# Patient Record
Sex: MALE | Race: WHITE | Employment: FULL TIME | URBAN - NONMETROPOLITAN AREA
[De-identification: names, ages, dates, MRNs, and addresses within clinical notes are randomized per-mention and may not be internally consistent; named-entity substitution may affect disease eponyms.]

---

## 2017-01-31 ENCOUNTER — HISTORY (OUTPATIENT)
Dept: INTERNAL MEDICINE | Facility: OTHER | Age: 27
End: 2017-01-31

## 2017-01-31 ENCOUNTER — OFFICE VISIT - GICH (OUTPATIENT)
Dept: INTERNAL MEDICINE | Facility: OTHER | Age: 27
End: 2017-01-31

## 2017-01-31 DIAGNOSIS — R00.0 TACHYCARDIA: ICD-10-CM

## 2017-01-31 DIAGNOSIS — R30.0 DYSURIA: ICD-10-CM

## 2017-01-31 DIAGNOSIS — M54.50 LOW BACK PAIN: ICD-10-CM

## 2017-01-31 LAB
A/G RATIO - HISTORICAL: 1.7 (ref 1–2)
ABSOLUTE BASOPHILS - HISTORICAL: 0.1 THOU/CU MM
ABSOLUTE EOSINOPHILS - HISTORICAL: 0.1 THOU/CU MM
ABSOLUTE LYMPHOCYTES - HISTORICAL: 1.4 THOU/CU MM (ref 0.9–2.9)
ABSOLUTE MONOCYTES - HISTORICAL: 0.4 THOU/CU MM
ABSOLUTE NEUTROPHILS - HISTORICAL: 2 THOU/CU MM (ref 1.7–7)
ALBUMIN SERPL-MCNC: 4.5 G/DL (ref 3.5–5.7)
ALP SERPL-CCNC: 73 IU/L (ref 34–104)
ALT (SGPT) - HISTORICAL: 18 IU/L (ref 7–52)
ANION GAP - HISTORICAL: 11 (ref 5–18)
AST SERPL-CCNC: 20 IU/L (ref 13–39)
BASOPHILS # BLD AUTO: 1.9 %
BILIRUB SERPL-MCNC: 0.9 MG/DL (ref 0.3–1)
BILIRUB UR QL: NEGATIVE
BUN SERPL-MCNC: 23 MG/DL (ref 7–25)
BUN/CREAT RATIO - HISTORICAL: 23
CALCIUM SERPL-MCNC: 9.5 MG/DL (ref 8.6–10.3)
CHLORIDE SERPLBLD-SCNC: 103 MMOL/L (ref 98–107)
CHOL/HDL RATIO - HISTORICAL: 2.65
CHOLESTEROL TOTAL: 122 MG/DL
CLARITY, URINE: CLEAR CLARITY
CO2 SERPL-SCNC: 28 MMOL/L (ref 21–31)
COLOR UR: YELLOW COLOR
CREAT SERPL-MCNC: 1.01 MG/DL (ref 0.7–1.3)
EOSINOPHIL NFR BLD AUTO: 2.3 %
ERYTHROCYTE [DISTWIDTH] IN BLOOD BY AUTOMATED COUNT: 12 % (ref 11.5–15.5)
GFR IF NOT AFRICAN AMERICAN - HISTORICAL: >60 ML/MIN/1.73M2
GLOBULIN - HISTORICAL: 2.7 G/DL (ref 2–3.7)
GLUCOSE SERPL-MCNC: 93 MG/DL (ref 70–105)
GLUCOSE URINE: NEGATIVE MG/DL
HCT VFR BLD AUTO: 45.6 % (ref 37–53)
HDLC SERPL-MCNC: 46 MG/DL (ref 23–92)
HEMOGLOBIN: 15.5 G/DL (ref 13.5–17.5)
KETONES UR QL: NEGATIVE MG/DL
LDLC SERPL CALC-MCNC: 57 MG/DL
LEUKOCYTE ESTERASE URINE: NEGATIVE
LYMPHOCYTES NFR BLD AUTO: 35.9 % (ref 20–44)
MCH RBC QN AUTO: 30.8 PG (ref 26–34)
MCHC RBC AUTO-ENTMCNC: 34 G/DL (ref 32–36)
MCV RBC AUTO: 91 FL (ref 80–100)
MONOCYTES NFR BLD AUTO: 9.3 %
NEUTROPHILS NFR BLD AUTO: 50.6 % (ref 42–72)
NITRITE UR QL STRIP: NEGATIVE
NON-HDL CHOLESTEROL - HISTORICAL: 76 MG/DL
OCCULT BLOOD,URINE - HISTORICAL: NEGATIVE
PATIENT STATUS - HISTORICAL: NORMAL
PH UR: 6.5 [PH]
PLATELET # BLD AUTO: 188 THOU/CU MM (ref 140–440)
PMV BLD: 7.8 FL (ref 6.5–11)
POTASSIUM SERPL-SCNC: 4.2 MMOL/L (ref 3.5–5.1)
PROT SERPL-MCNC: 7.2 G/DL (ref 6.4–8.9)
PROTEIN QUALITATIVE,URINE - HISTORICAL: NEGATIVE MG/DL
RED BLOOD COUNT - HISTORICAL: 5.03 MIL/CU MM (ref 4.3–5.9)
SODIUM SERPL-SCNC: 142 MMOL/L (ref 133–143)
SP GR UR STRIP: 1.01
TRIGL SERPL-MCNC: 93 MG/DL
TSH - HISTORICAL: 1.31 UIU/ML (ref 0.34–5.6)
UROBILINOGEN,QUALITATIVE - HISTORICAL: NORMAL EU/DL
WHITE BLOOD COUNT - HISTORICAL: 3.9 THOU/CU MM (ref 4.5–11)

## 2017-04-06 ENCOUNTER — COMMUNICATION - GICH (OUTPATIENT)
Dept: INTERNAL MEDICINE | Facility: OTHER | Age: 27
End: 2017-04-06

## 2018-01-03 NOTE — NURSING NOTE
Patient Information     Patient Name MRN Sex Damon Egan [id number] Male 1990      Nursing Note by Osito Echavarria LPN at 2017  8:30 AM     Author:  Osito Echavarria LPN Service:  (none) Author Type:  NURS- Licensed Practical Nurse     Filed:  2017  8:30 AM Encounter Date:  2017 Status:  Signed     :  Osito Echavarria LPN (NURS- Licensed Practical Nurse)            Patient presents to the clinic for testicle pain in the last month and now it's been the lower back as well.  Osito Echavarria LPN ..............2017 8:23 AM

## 2018-01-03 NOTE — PROGRESS NOTES
"Patient Information     Patient Name MRN Sex Damon Egan [id number] Male 1990      Progress Notes by Jerson Jenkins MD at 2017  8:30 AM     Author:  Jerson Jenkins MD Service:  (none) Author Type:  Physician     Filed:  2017  8:54 AM Encounter Date:  2017 Status:  Signed     :  Jerson Jenkins MD (Physician)            SUBJECTIVE:    Damon Santiago is a 27 y.o. male who presents for back and groin pain.    HPI Comments: He comes in today for a check on some problems that he is having. He's had a sensation of testicular discomfort. He says it almost feels as though his testicles are being squeezed or grabbed. He has a little bit of urinary difficulty with this but nothing significant. He has now developed some bilateral flank area discomfort. He has not had a fever with this. He doesn't know whether the back pain could be related to working out and lifting weights or whether it somehow related to his urinary symptoms. He's here to have that evaluated and figured out.    On exam he was noted to have somewhat of a tachycardia. In addition he has somewhat of a tremor. There are thyroid problems in the family.    I spent time today updating his past medical history, past surgical history, family history and social history.      Allergies     Allergen  Reactions     Sulfa (Sulfonamide Antibiotics) *Unknown   ,   Current Outpatient Prescriptions     Medication  Sig     multivitamin (MVI) tablet Take 1 tablet by mouth once daily.     No current facility-administered medications for this visit.      Medications have been reviewed by me and are current to the best of my knowledge and ability. ,   Past Medical History     Diagnosis  Date     History of anxiety      History of ITP    ,   Patient Active Problem List       Diagnosis  Date Noted     Dysuria       Murmur  2014     Mole of skin  10/21/2013     FOLLICULITIS  2012     Likely Pseudomonas \"hot tub folliculitis \"      " "    ABDOMINAL PAIN, RECURRENT  11/30/2010     IMMUNE THROMBOCYTOPENIC PURPURA       platelets 12,000, petechiae          ANXIETY DISORDER     ,   Past Surgical History       Procedure   Laterality Date     Cystoscopy   2016     WNL      and   Social History       Substance Use Topics         Smoking status:   Never Smoker     Smokeless tobacco:   Never Used     Alcohol use   Yes      Comment: social      Family Status     Relation  Status     Father Alive     Mother Alive     Brother Alive     Social History     Social History        Marital status:  Single     Spouse name: N/A     Number of children:  N/A     Years of education:  N/A     Social History Main Topics          Smoking status:   Never Smoker      Smokeless tobacco:   Never Used      Alcohol use   Yes      Comment: social       Drug use:   No      Sexual activity:   Yes      Partners:  Female      Birth control/ protection:  None       Comment: Been with same partner for 1.5 years       Other Topics  Concern     None      Social History Narrative      at [employer].  Engaged to be .                       REVIEW OF SYSTEMS:  Review of Systems   All other systems reviewed and are negative.      OBJECTIVE:  /70  Pulse 100  Temp 97.7  F (36.5  C) (Tympanic)  Ht 1.854 m (6' 1\")  Wt 85.4 kg (188 lb 3.2 oz)  BMI 24.83 kg/m2    EXAM:   Physical Exam   Constitutional: He is well-developed, well-nourished, and in no distress. No distress.   Slightly anxious   Neck: Normal range of motion. Neck supple. No JVD present. No tracheal deviation present. No thyromegaly present.   Cardiovascular: Regular rhythm.  Tachycardia present.    Pulmonary/Chest: Effort normal and breath sounds normal. No respiratory distress. He has no wheezes. He has no rales.   Abdominal: Soft. Bowel sounds are normal. He exhibits no distension and no mass. There is no tenderness. There is no rebound and no guarding.   Genitourinary: Penis normal. He exhibits no " abnormal testicular mass, no testicular tenderness, no abnormal scrotal mass and no scrotal tenderness.   Musculoskeletal: He exhibits no edema.   Lymphadenopathy:     He has no cervical adenopathy.   Neurological: He is alert.   Slight tremor   Skin: Skin is warm and dry. He is not diaphoretic.   Psychiatric: Affect normal.   Nursing note and vitals reviewed.      ASSESSMENT/PLAN:    ICD-10-CM    1. Dysuria R30.0 URINALYSIS W REFLEX MICROSCOPIC IF POSITIVE      URINALYSIS W REFLEX MICROSCOPIC IF POSITIVE   2. Tachycardia R00.0    3. Bilateral low back pain without sciatica, unspecified chronicity M54.5         Plan:  His urine is normal. I'm not thinking there is going to be too much pathology from a genitourinary standpoint. His low back pain might just be from working out. I suggested watchful waiting. Because of these issues and also because of his slight tachycardia and tremor, I elected to do some other blood work including thyroid. Complete panel is pending and I will send him a letter with the results. If everything is normal and his lab but he has persistent problems she will return for re-evaluation. May have to consider CT scanning although I think that would be somewhat excessive at this time.

## 2018-01-27 VITALS
TEMPERATURE: 97.7 F | SYSTOLIC BLOOD PRESSURE: 130 MMHG | HEIGHT: 73 IN | DIASTOLIC BLOOD PRESSURE: 70 MMHG | WEIGHT: 188.2 LBS | HEART RATE: 100 BPM | BODY MASS INDEX: 24.94 KG/M2

## 2018-02-19 ENCOUNTER — DOCUMENTATION ONLY (OUTPATIENT)
Dept: FAMILY MEDICINE | Facility: OTHER | Age: 28
End: 2018-02-19

## 2018-02-19 PROBLEM — F41.1 ANXIETY STATE: Status: ACTIVE | Noted: 2018-02-19

## 2018-02-19 PROBLEM — D69.3 IMMUNE THROMBOCYTOPENIC PURPURA (H): Status: ACTIVE | Noted: 2018-02-19

## 2018-02-19 PROBLEM — R30.0 DYSURIA: Status: ACTIVE | Noted: 2018-02-19

## 2018-02-19 RX ORDER — DIPHENOXYLATE HYDROCHLORIDE AND ATROPINE SULFATE 2.5; .025 MG/1; MG/1
1 TABLET ORAL DAILY
COMMUNITY
Start: 2017-01-31

## 2018-07-23 NOTE — PROGRESS NOTES
Patient Information     Patient Name  Damon Santiago MRN  [id number] Sex  Male   1990      Letter by Jerson Jenkins MD at      Author:  Jerson Jenkins MD Service:  (none) Author Type:  (none)    Filed:   Encounter Date:  2017 Status:  (Other)           Damon Santiago  [address]  [place] [address]          2017    Dear Damon,    Following are the tests completed during your last clinic visit:    Results for orders placed or performed in visit on 17      URINALYSIS W REFLEX MICROSCOPIC IF POSITIVE      Result  Value Ref Range    COLOR                     Yellow Yellow Color    CLARITY                   Clear Clear Clarity    SPECIFIC GRAVITY,URINE    1.015 1.010, 1.015, 1.020, 1.025                    PH,URINE                  6.5 6.0, 7.0, 8.0, 5.5, 6.5, 7.5, 8.5                    UROBILINOGEN,QUALITATIVE  Normal Normal EU/dl    PROTEIN, URINE Negative Negative mg/dL    GLUCOSE, URINE Negative Negative mg/dL    KETONES,URINE             Negative Negative mg/dL    BILIRUBIN,URINE           Negative Negative                    OCCULT BLOOD,URINE        Negative Negative                    NITRITE                   Negative Negative                    LEUKOCYTE ESTERASE        Negative Negative                   COMP METABOLIC PANEL      Result  Value Ref Range    SODIUM 142 133 - 143 mmol/L    POTASSIUM 4.2 3.5 - 5.1 mmol/L    CHLORIDE 103 98 - 107 mmol/L    CO2,TOTAL 28 21 - 31 mmol/L    ANION GAP 11 5 - 18                    GLUCOSE 93 70 - 105 mg/dL    CALCIUM 9.5 8.6 - 10.3 mg/dL    BUN 23 7 - 25 mg/dL    CREATININE 1.01 0.70 - 1.30 mg/dL    BUN/CREAT RATIO           23                    GFR if African American >60 >60 ml/min/1.73m2    GFR if not African American >60 >60 ml/min/1.73m2    ALBUMIN 4.5 3.5 - 5.7 g/dL    PROTEIN,TOTAL 7.2 6.4 - 8.9 g/dL    GLOBULIN                  2.7 2.0 - 3.7 g/dL    A/G RATIO 1.7 1.0 - 2.0                    BILIRUBIN,TOTAL 0.9 0.3 -  1.0 mg/dL    ALK PHOSPHATASE 73 34 - 104 IU/L    ALT (SGPT) 18 7 - 52 IU/L    AST (SGOT) 20 13 - 39 IU/L   LIPID PANEL      Result  Value Ref Range    CHOLESTEROL,TOTAL 122 <200 mg/dL    TRIGLYCERIDES 93 <150 mg/dL    HDL CHOLESTEROL 46 23 - 92 mg/dL    NON-HDL CHOLESTEROL 76 <145 mg/dl    CHOL/HDL RATIO            2.65 <4.50                    LDL CHOLESTEROL 57 <100 mg/dL    PATIENT STATUS            FASTING                   TSH      Result  Value Ref Range    TSH 1.31 0.34 - 5.60 uIU/mL   CBC WITH AUTO DIFFERENTIAL      Result  Value Ref Range    WHITE BLOOD COUNT         3.9 (L) 4.5 - 11.0 thou/cu mm    RED BLOOD COUNT           5.03 4.30 - 5.90 mil/cu mm    HEMOGLOBIN                15.5 13.5 - 17.5 g/dL    HEMATOCRIT                45.6 37.0 - 53.0 %    MCV                       91 80 - 100 fL    MCH                       30.8 26.0 - 34.0 pg    MCHC                      34.0 32.0 - 36.0 g/dL    RDW                       12.0 11.5 - 15.5 %    PLATELET COUNT            188 140 - 440 thou/cu mm    MPV                       7.8 6.5 - 11.0 fL    NEUTROPHILS               50.6 42.0 - 72.0 %    LYMPHOCYTES               35.9 20.0 - 44.0 %    MONOCYTES                 9.3 <12.0 %    EOSINOPHILS               2.3 <8.0 %    BASOPHILS                 1.9 <3.0 %    ABSOLUTE NEUTROPHILS      2.0 1.7 - 7.0 thou/cu mm    ABSOLUTE LYMPHOCYTES      1.4 0.9 - 2.9 thou/cu mm    ABSOLUTE MONOCYTES        0.4 <0.9 thou/cu mm    ABSOLUTE EOSINOPHILS      0.1 <0.5 thou/cu mm    ABSOLUTE BASOPHILS        0.1 <0.3 thou/cu mm             All of your blood tests are normal. Of course your urine test was normal as well. Congratulations on this excellent report. If you have any questions about your results or are still not feeling well, be sure to let me know.    Sincerely,      Jerson Jenkins MD  Internal Medicine  [facility]

## 2018-07-24 NOTE — PROGRESS NOTES
Patient Information     Patient Name  Damon Santiago MRN  [id number] Sex  Male   1990      Letter by Jerson Jenkins MD at      Author:  Jerson Jenkins MD Service:  (none) Author Type:  (none)    Filed:   Encounter Date:  2017 Status:  (Other)         [employer] [facility]  [address] [address] [address] [place] [address]    2017    Damon Santiago  [address]  [place] [facility] [place] [address]    Dear Mr. Santiago    We have received your [employer] application. However, additional information is required before we can complete the process. The following item(s) are missing or incomplete:    Email does not match email listed in your medical record.    Demographic information in your health record, must be updated by your primary care clinic or by calling [employer] [facility] at [phone].    You will need to complete and submit a new application with all required information. Please go to [email or web address] to:    print new application form(s) by clicking on Sign up now    sign up online for an account for yourself.    For questions or technical assistance, call [phone].    Thank you for choosing [employer]!

## 2019-10-10 ENCOUNTER — HOSPITAL ENCOUNTER (OUTPATIENT)
Dept: MRI IMAGING | Facility: OTHER | Age: 29
Discharge: HOME | End: 2019-10-10
Attending: NURSE PRACTITIONER | Admitting: FAMILY MEDICINE
Payer: COMMERCIAL

## 2019-10-10 DIAGNOSIS — R20.0 NUMBNESS: ICD-10-CM

## 2019-10-10 DIAGNOSIS — M79.89 MASS OF SOFT TISSUE: ICD-10-CM

## 2019-10-10 PROCEDURE — A9575 INJ GADOTERATE MEGLUMI 0.1ML: HCPCS | Performed by: RADIOLOGY

## 2019-10-10 PROCEDURE — 72157 MRI CHEST SPINE W/O & W/DYE: CPT

## 2019-10-10 PROCEDURE — 25500064 ZZH RX 255 OP 636: Performed by: RADIOLOGY

## 2019-10-10 RX ADMIN — GADOTERATE MEGLUMINE 17 ML: 376.9 INJECTION INTRAVENOUS at 09:18

## 2020-03-05 DIAGNOSIS — N52.8 OTHER MALE ERECTILE DYSFUNCTION: Primary | ICD-10-CM

## 2020-03-05 RX ORDER — SILDENAFIL CITRATE 20 MG/1
20-40 TABLET ORAL DAILY PRN
Qty: 40 TABLET | Refills: 2 | Status: SHIPPED | OUTPATIENT
Start: 2020-03-05 | End: 2020-03-10

## 2020-03-05 NOTE — PROGRESS NOTES
Along with wife at her OB check appointment today; and discusses difficulty in obtaining an erection.  Likely psychological with onset at same time as finding out they were pregnant.  He does not feel stressed about the upcoming baby, does admit to stress at work.  Ruminates daily about not being able to obtain an erection.  Had tried a friend's viagra and that was helpful.  Would like his own Rx and will follow up in one week to review possible anxiety and other psychological issues contributing to current symptoms.  Rx sent to pharmacy.  Nilsa Rowan DO on 3/5/2020 at 12:39 PM

## 2020-03-10 ENCOUNTER — OFFICE VISIT (OUTPATIENT)
Dept: FAMILY MEDICINE | Facility: OTHER | Age: 30
End: 2020-03-10
Attending: FAMILY MEDICINE
Payer: COMMERCIAL

## 2020-03-10 VITALS
HEART RATE: 76 BPM | WEIGHT: 194 LBS | TEMPERATURE: 96.1 F | RESPIRATION RATE: 20 BRPM | HEIGHT: 74 IN | DIASTOLIC BLOOD PRESSURE: 86 MMHG | BODY MASS INDEX: 24.9 KG/M2 | SYSTOLIC BLOOD PRESSURE: 130 MMHG

## 2020-03-10 DIAGNOSIS — N52.8 OTHER MALE ERECTILE DYSFUNCTION: ICD-10-CM

## 2020-03-10 DIAGNOSIS — F41.1 ANXIETY STATE: Primary | ICD-10-CM

## 2020-03-10 PROBLEM — D69.3 IMMUNE THROMBOCYTOPENIC PURPURA (H): Status: RESOLVED | Noted: 2018-02-19 | Resolved: 2020-03-10

## 2020-03-10 LAB
ALBUMIN SERPL-MCNC: 4.7 G/DL (ref 3.5–5.7)
ALP SERPL-CCNC: 66 U/L (ref 34–104)
ALT SERPL W P-5'-P-CCNC: 23 U/L (ref 7–52)
ANION GAP SERPL CALCULATED.3IONS-SCNC: 9 MMOL/L (ref 3–14)
AST SERPL W P-5'-P-CCNC: 18 U/L (ref 13–39)
BASOPHILS # BLD AUTO: 0.1 10E9/L (ref 0–0.2)
BASOPHILS NFR BLD AUTO: 1.2 %
BILIRUB SERPL-MCNC: 0.9 MG/DL (ref 0.3–1)
BUN SERPL-MCNC: 20 MG/DL (ref 7–25)
CALCIUM SERPL-MCNC: 9.5 MG/DL (ref 8.6–10.3)
CHLORIDE SERPL-SCNC: 103 MMOL/L (ref 98–107)
CHOLEST SERPL-MCNC: 132 MG/DL
CO2 SERPL-SCNC: 28 MMOL/L (ref 21–31)
CREAT SERPL-MCNC: 1.06 MG/DL (ref 0.7–1.3)
CRP SERPL-MCNC: 0.2 MG/L
DIFFERENTIAL METHOD BLD: NORMAL
EOSINOPHIL # BLD AUTO: 0.2 10E9/L (ref 0–0.7)
EOSINOPHIL NFR BLD AUTO: 3.7 %
ERYTHROCYTE [DISTWIDTH] IN BLOOD BY AUTOMATED COUNT: 11.9 % (ref 10–15)
ERYTHROCYTE [SEDIMENTATION RATE] IN BLOOD BY WESTERGREN METHOD: 2 MM/H (ref 1–10)
GFR SERPL CREATININE-BSD FRML MDRD: 81 ML/MIN/{1.73_M2}
GLUCOSE SERPL-MCNC: 106 MG/DL (ref 70–105)
HBA1C MFR BLD: 4.9 % (ref 4–6)
HCT VFR BLD AUTO: 45.8 % (ref 40–53)
HDLC SERPL-MCNC: 45 MG/DL (ref 23–92)
HGB BLD-MCNC: 15.2 G/DL (ref 13.3–17.7)
IMM GRANULOCYTES # BLD: 0 10E9/L (ref 0–0.4)
IMM GRANULOCYTES NFR BLD: 0.2 %
LDLC SERPL CALC-MCNC: 63 MG/DL
LYMPHOCYTES # BLD AUTO: 1.4 10E9/L (ref 0.8–5.3)
LYMPHOCYTES NFR BLD AUTO: 34.1 %
MCH RBC QN AUTO: 30.6 PG (ref 26.5–33)
MCHC RBC AUTO-ENTMCNC: 33.2 G/DL (ref 31.5–36.5)
MCV RBC AUTO: 92 FL (ref 78–100)
MONOCYTES # BLD AUTO: 0.4 10E9/L (ref 0–1.3)
MONOCYTES NFR BLD AUTO: 10 %
NEUTROPHILS # BLD AUTO: 2.1 10E9/L (ref 1.6–8.3)
NEUTROPHILS NFR BLD AUTO: 50.8 %
NONHDLC SERPL-MCNC: 87 MG/DL
PLATELET # BLD AUTO: 192 10E9/L (ref 150–450)
POTASSIUM SERPL-SCNC: 4.1 MMOL/L (ref 3.5–5.1)
PROT SERPL-MCNC: 7.5 G/DL (ref 6.4–8.9)
RBC # BLD AUTO: 4.96 10E12/L (ref 4.4–5.9)
SODIUM SERPL-SCNC: 140 MMOL/L (ref 134–144)
TESTOST SERPL-MCNC: 366 NG/DL (ref 175–781)
TRIGL SERPL-MCNC: 119 MG/DL
TSH SERPL DL<=0.05 MIU/L-ACNC: 1.09 IU/ML (ref 0.34–5.6)
WBC # BLD AUTO: 4.1 10E9/L (ref 4–11)

## 2020-03-10 PROCEDURE — 83036 HEMOGLOBIN GLYCOSYLATED A1C: CPT | Mod: ZL | Performed by: FAMILY MEDICINE

## 2020-03-10 PROCEDURE — 85025 COMPLETE CBC W/AUTO DIFF WBC: CPT | Mod: ZL | Performed by: FAMILY MEDICINE

## 2020-03-10 PROCEDURE — 84403 ASSAY OF TOTAL TESTOSTERONE: CPT | Mod: ZL | Performed by: FAMILY MEDICINE

## 2020-03-10 PROCEDURE — 85652 RBC SED RATE AUTOMATED: CPT | Mod: ZL | Performed by: FAMILY MEDICINE

## 2020-03-10 PROCEDURE — 99214 OFFICE O/P EST MOD 30 MIN: CPT | Performed by: FAMILY MEDICINE

## 2020-03-10 PROCEDURE — 80053 COMPREHEN METABOLIC PANEL: CPT | Mod: ZL | Performed by: FAMILY MEDICINE

## 2020-03-10 PROCEDURE — 84443 ASSAY THYROID STIM HORMONE: CPT | Mod: ZL | Performed by: FAMILY MEDICINE

## 2020-03-10 PROCEDURE — 86140 C-REACTIVE PROTEIN: CPT | Mod: ZL | Performed by: FAMILY MEDICINE

## 2020-03-10 PROCEDURE — 80061 LIPID PANEL: CPT | Mod: ZL | Performed by: FAMILY MEDICINE

## 2020-03-10 PROCEDURE — 36415 COLL VENOUS BLD VENIPUNCTURE: CPT | Mod: ZL | Performed by: FAMILY MEDICINE

## 2020-03-10 RX ORDER — SILDENAFIL CITRATE 20 MG/1
20-40 TABLET ORAL DAILY PRN
Qty: 40 TABLET | Refills: 5 | Status: SHIPPED | OUTPATIENT
Start: 2020-03-10 | End: 2024-09-07

## 2020-03-10 ASSESSMENT — ANXIETY QUESTIONNAIRES
GAD7 TOTAL SCORE: 14
6. BECOMING EASILY ANNOYED OR IRRITABLE: MORE THAN HALF THE DAYS
2. NOT BEING ABLE TO STOP OR CONTROL WORRYING: NEARLY EVERY DAY
3. WORRYING TOO MUCH ABOUT DIFFERENT THINGS: NEARLY EVERY DAY
7. FEELING AFRAID AS IF SOMETHING AWFUL MIGHT HAPPEN: SEVERAL DAYS
1. FEELING NERVOUS, ANXIOUS, OR ON EDGE: MORE THAN HALF THE DAYS
5. BEING SO RESTLESS THAT IT IS HARD TO SIT STILL: SEVERAL DAYS
IF YOU CHECKED OFF ANY PROBLEMS ON THIS QUESTIONNAIRE, HOW DIFFICULT HAVE THESE PROBLEMS MADE IT FOR YOU TO DO YOUR WORK, TAKE CARE OF THINGS AT HOME, OR GET ALONG WITH OTHER PEOPLE: VERY DIFFICULT

## 2020-03-10 ASSESSMENT — ENCOUNTER SYMPTOMS
COUGH: 0
DIARRHEA: 0
HEMATOCHEZIA: 0
CHILLS: 0
NERVOUS/ANXIOUS: 1
HEARTBURN: 0
FEVER: 0
APPETITE CHANGE: 0
FREQUENCY: 0
CONSTIPATION: 0
PALPITATIONS: 0
ACTIVITY CHANGE: 0
FATIGUE: 0
HEMATURIA: 0
CHEST TIGHTNESS: 1
DYSURIA: 0
SHORTNESS OF BREATH: 0
ABDOMINAL PAIN: 0

## 2020-03-10 ASSESSMENT — PATIENT HEALTH QUESTIONNAIRE - PHQ9
5. POOR APPETITE OR OVEREATING: MORE THAN HALF THE DAYS
SUM OF ALL RESPONSES TO PHQ QUESTIONS 1-9: 6

## 2020-03-10 ASSESSMENT — MIFFLIN-ST. JEOR: SCORE: 1904.73

## 2020-03-10 ASSESSMENT — PAIN SCALES - GENERAL: PAINLEVEL: NO PAIN (0)

## 2020-03-10 NOTE — NURSING NOTE
"Chief Complaint   Patient presents with     Anxiety       Initial /86   Pulse 76   Temp 96.1  F (35.6  C) (Tympanic)   Resp 20   Ht 1.88 m (6' 2\")   Wt 88 kg (194 lb)   BMI 24.91 kg/m   Estimated body mass index is 24.91 kg/m  as calculated from the following:    Height as of this encounter: 1.88 m (6' 2\").    Weight as of this encounter: 88 kg (194 lb).  Medication Reconciliation: complete    Nata Nieves LPN  "

## 2020-03-11 ASSESSMENT — ANXIETY QUESTIONNAIRES: GAD7 TOTAL SCORE: 14

## 2020-03-11 NOTE — PATIENT INSTRUCTIONS
Patient Education     Your Body s Response to Anxiety    Normal anxiety is part of the body s natural defense system. It's an alert to a threat that is unknown, vague, or comes from your own internal fears. While you re in this state, your feelings can range from a vague sense of worry to physical sensations such as a pounding heartbeat. These feelings make you want to react to the threat. An anxiety response is normal in many situations. But when you have an anxiety disorder, the same response can occur at the wrong times.  Anxiety can be helpful  Normal anxiety is a signal from your brain that warns you of a threat and is a normal response to help you prevent something or decrease the bad effects of something you can't control. For example, anxiety is a normal response to situations that might damage your body, separate you from a loved one, or lose your job. The symptoms of anxiety can be physical and mental.  How does it feel?  At certain times, people with anxiety may have:    Dizziness    Muscle tension or pain    Restlessness    Sleeplessness    Trouble concentrating    Racing heartbeat    Fast breathing    Shaking or trembling    Stomachache    Diarrhea    Loss of energy    Sweating    Cold, clammy hands    Chest pain    Dry mouth  Anxiety can also be a problem  Anxiety can become a problem when it is hard to control, occurs for months, and interferes with important parts of your life. With an anxiety disorder, your body has the response described above, but in inappropriate ways. The response a person has depends on the anxiety disorder he or she has. With some disorders, the anxiety is way out of proportion to the threat that triggers it. With others, anxiety may occur even when there isn t a clear threat or trigger.  Who does it affect?  Some people are more prone to persistent anxiety than others. It tends to run in families, and it affects more younger people than older people, and more women than men.  "But no age, race, or gender is immune to anxiety problems.  Anxiety can be treated  The good news is that the anxiety that s disrupting your life can be treated. Check with your healthcare provider and rule out any physical problems that may be causing the anxiety symptoms. If an anxiety disorder is diagnosed seek mental healthcare. This is an illness and it can respond to treatment. Most types of anxiety disorders will respond to \"talk therapy\" and medicines. Working with your doctor or other healthcare provider, you can develop skills to help you cope with anxiety. You can also gain the perspective you need to overcome your fears. Note: Good sources of support or guidance can be found at your local hospital, mental health clinic, or an employee assistance program.  How to cope with anxiety  If anxiety is wearing you down, here are some things you can do to cope:    Keep in mind that you can t control everything about a situation. Change what you can and let the rest take its course.    Exercise--it s a great way to relieve tension and help your body feel relaxed.    Avoid caffeine and nicotine, which can make anxiety symptoms worse.    Fight the temptation to turn to alcohol or unprescribed drugs for relief. They only make things worse in the long run.    Educate yourself about anxiety disorders. Keep track of helpful online resources and books you can use during stressful periods.    Try stress management techniques such as meditation.    Consider online or in-person support groups.   Date Last Reviewed: 1/1/2017 2000-2019 [employer]. [address] [place] [address], [place] [address]. All rights reserved. This information is not intended as a substitute for professional medical care. Always follow your healthcare professional's instructions.           "

## 2020-12-27 ENCOUNTER — HEALTH MAINTENANCE LETTER (OUTPATIENT)
Age: 30
End: 2020-12-27

## 2021-03-28 ENCOUNTER — ALLIED HEALTH/NURSE VISIT (OUTPATIENT)
Dept: FAMILY MEDICINE | Facility: OTHER | Age: 31
End: 2021-03-28
Attending: FAMILY MEDICINE
Payer: COMMERCIAL

## 2021-03-28 DIAGNOSIS — Z20.822 EXPOSURE TO COVID-19 VIRUS: Primary | ICD-10-CM

## 2021-03-28 LAB
SARS-COV-2 RNA RESP QL NAA+PROBE: NORMAL
SPECIMEN SOURCE: NORMAL

## 2021-03-28 PROCEDURE — U0005 INFEC AGEN DETEC AMPLI PROBE: HCPCS | Mod: ZL | Performed by: FAMILY MEDICINE

## 2021-03-28 PROCEDURE — U0003 INFECTIOUS AGENT DETECTION BY NUCLEIC ACID (DNA OR RNA); SEVERE ACUTE RESPIRATORY SYNDROME CORONAVIRUS 2 (SARS-COV-2) (CORONAVIRUS DISEASE [COVID-19]), AMPLIFIED PROBE TECHNIQUE, MAKING USE OF HIGH THROUGHPUT TECHNOLOGIES AS DESCRIBED BY CMS-2020-01-R: HCPCS | Mod: ZL | Performed by: FAMILY MEDICINE

## 2021-03-28 PROCEDURE — C9803 HOPD COVID-19 SPEC COLLECT: HCPCS

## 2021-03-29 LAB
LABORATORY COMMENT REPORT: NORMAL
SARS-COV-2 RNA RESP QL NAA+PROBE: NEGATIVE
SPECIMEN SOURCE: NORMAL

## 2021-08-24 ENCOUNTER — OFFICE VISIT (OUTPATIENT)
Dept: FAMILY MEDICINE | Facility: OTHER | Age: 31
End: 2021-08-24
Attending: PHYSICIAN ASSISTANT
Payer: COMMERCIAL

## 2021-08-24 VITALS
BODY MASS INDEX: 23.72 KG/M2 | DIASTOLIC BLOOD PRESSURE: 84 MMHG | OXYGEN SATURATION: 100 % | RESPIRATION RATE: 18 BRPM | SYSTOLIC BLOOD PRESSURE: 136 MMHG | HEART RATE: 91 BPM | TEMPERATURE: 98.1 F | HEIGHT: 74 IN | WEIGHT: 184.8 LBS

## 2021-08-24 DIAGNOSIS — N50.9 TESTICLE TROUBLE: ICD-10-CM

## 2021-08-24 DIAGNOSIS — N39.9 URINARY PROBLEM IN MALE: Primary | ICD-10-CM

## 2021-08-24 DIAGNOSIS — S39.012A BACK STRAIN, INITIAL ENCOUNTER: ICD-10-CM

## 2021-08-24 LAB
ALBUMIN UR-MCNC: NEGATIVE MG/DL
APPEARANCE UR: CLEAR
BILIRUB UR QL STRIP: NEGATIVE
C TRACH DNA SPEC QL PROBE+SIG AMP: NEGATIVE
COLOR UR AUTO: ABNORMAL
GLUCOSE UR STRIP-MCNC: NEGATIVE MG/DL
HGB UR QL STRIP: NEGATIVE
KETONES UR STRIP-MCNC: 10 MG/DL
LEUKOCYTE ESTERASE UR QL STRIP: NEGATIVE
N GONORRHOEA DNA SPEC QL NAA+PROBE: NEGATIVE
NITRATE UR QL: NEGATIVE
PH UR STRIP: 6.5 [PH] (ref 5–9)
SP GR UR STRIP: 1.02 (ref 1–1.03)
UROBILINOGEN UR STRIP-MCNC: NORMAL MG/DL

## 2021-08-24 PROCEDURE — 87491 CHLMYD TRACH DNA AMP PROBE: CPT | Mod: ZL | Performed by: PHYSICIAN ASSISTANT

## 2021-08-24 PROCEDURE — 81003 URINALYSIS AUTO W/O SCOPE: CPT | Mod: ZL | Performed by: PHYSICIAN ASSISTANT

## 2021-08-24 PROCEDURE — 99213 OFFICE O/P EST LOW 20 MIN: CPT | Performed by: PHYSICIAN ASSISTANT

## 2021-08-24 ASSESSMENT — MIFFLIN-ST. JEOR: SCORE: 1858

## 2021-08-24 ASSESSMENT — PAIN SCALES - GENERAL: PAINLEVEL: NO PAIN (0)

## 2021-08-24 NOTE — PROGRESS NOTES
ASSESSMENT/PLAN:    I have reviewed the nursing notes.  I have reviewed the findings, diagnosis, plan and need for follow up with the patient.    1. Back strain, initial encounter  If feel patient's back pain is more muscular in nature from working out and recent increase of activity (soft ball x2 weeks prior). I do not have concerns for bony or other soft tissue injury at this time as exam most consistent with strain. Discussed that strains are typically self-limiting and will heal in 4 to 8 weeks duration of time.  Recommend alternating Tylenol and ibuprofen every 4-6 hours if able, do not exceed daily limits as reviewed on AVS (4000 mg of Tylenol daily, 1200 mg of ibuprofen daily), alternate heat and ice, gentle range of motion as tolerated.  If an orthopedic referral was placed patient understands that they will contact the patient directly to schedule this visit.  Patient runs into any difficulties/setbacks during recovery they should follow-up with her PCP or orthopedics for reevaluation. Patient is in agreement and understanding of the above treatment plan. All questions and concerns were addressed and answered to patient's satisfaction. AVS reviewed with patient.     2. Testicle trouble  In regards to reported testicular irritation, patient's PE was overall normal symptoms are unable to be reproduced on exam, without any acute findings. No concerns of torsion at this time. UA ordered as well as GC to rule out infectious etiology to symptoms, results are as follows: Negative urine and GC chlamydia.  Due to normal physical exam and laboratory studies and ultrasound was not ordered at this time.  Believe that symptoms could be due to patient recently working on increasing workouts.  If symptoms persist recommend follow-up with PCP.  If any alarm signs such as fevers, chills, worsening testicular pain or other abnormalities occur such as a high riding testicle patient is agreeable to return for  reevaluation.    3. Urinary problem in male  - UA reflex to Microscopic and Culture  - GC/Chlamydia by PCR    If feel patient's back pain is more muscular in nature from working out and recent increase of activity (soft ball x2 weeks prior). I do not have concerns for bony or other soft tissue injury at this time as exam most consistent with strain. Discussed that strains are typically self-limiting and will heal in 4 to 8 weeks duration of time.  Recommend alternating Tylenol and ibuprofen every 4-6 hours if able, do not exceed daily limits as reviewed on AVS (4000 mg of Tylenol daily, 1200 mg of ibuprofen daily), alternate heat and ice, gentle range of motion as tolerated.  If an orthopedic referral was placed patient understands that they will contact the patient directly to schedule this visit.  Patient runs into any difficulties/setbacks during recovery they should follow-up with her PCP or orthopedics for reevaluation. Patient is in agreement and understanding of the above treatment plan. All questions and concerns were addressed and answered to patient's satisfaction. AVS reviewed with patient.     Discussed warning signs/symptoms indicative of need to f/u    Follow up if symptoms persist or worsen or concerns    I explained my diagnostic considerations and recommendations to the patient, who voiced understanding and agreement with the treatment plan. All questions were answered. We discussed potential side effects of any prescribed or recommended therapies, as well as expectations for response to treatments.    Nedra Crabtree PA-C  8/24/2021  11:05 AM    HPI:    Damon Santiago is a 31 year old male  who presents to [facility] today for concerns of back pain and left testicular pain x 7-10 days duration.     Back symptoms:  His back pain located at low back left  Duration of symptoms: 1.5 weeks  Pain: 0/10, dull ache  Pain progression: same  Injury: none  Mechanical symptoms (popping, locking, catching):  No  Palliative: unsure  Provocative: unsure  Presence of sciatic symptoms: none    Treatments Tried: ibuprofen and Tylenol    Testicular symptoms:   -Onset 7-10 days  -Pain only with palpation, otherwise no pain or tenderness   -Swelling: thought at first, but now feels it is normal size for him  -Urinary symptoms: denies urinary symptoms (blood, urgency, frequency, etc.) denies fevers, chills, signs of infection.   -No trauma to site - does work out, but low weight low repetition   -Concerns of STD/STI: none - no new partners  -History of similar symptoms: none    Treatments Tried: ibuprofen and Tylenol    Prior history of similar symptoms: none    No fevers, weight loss, bowel / bladder incontinence or localized weakness.    PCP: MD Gregory    Past Medical History:   Diagnosis Date     Immune thrombocytopenic purpura (H) 2/19/2018    Overview:  platelets 12,000, petechiae     Personal history of diseases of the blood and blood-forming organs and certain disorders involving the immune mechanism (CODE)     No Comments Provided     Personal history of other mental and behavioral disorders     No Comments Provided     Past Surgical History:   Procedure Laterality Date     CYSTOSCOPY      2016,WNL     Social History     Tobacco Use     Smoking status: Never Smoker     Smokeless tobacco: Never Used   Substance Use Topics     Alcohol use: Yes     Comment: Alcoholic Drinks/day: social     Current Outpatient Medications   Medication Sig Dispense Refill     Multiple Vitamin (MULTI-VITAMINS) TABS Take 1 tablet by mouth daily       sildenafil (REVATIO) 20 MG tablet Take 1-2 tablets (20-40 mg) by mouth daily as needed (ED) 40 tablet 5     Allergies   Allergen Reactions     Sulfa Drugs Unknown     Past medical history, past surgical history, current medications and allergies reviewed and accurate to the best of my knowledge.      ROS:  Refer to HPI    /84   Pulse 91   Temp 98.1  F (36.7  C) (Tympanic)   Resp 18   Ht  "1.88 m (6' 2\")   Wt 83.8 kg (184 lb 12.8 oz)   SpO2 100%   BMI 23.73 kg/m      EXAM:  General Appearance: Well appearing 31-year old male, appropriate appearance for age. No acute distress  Respiratory: normal chest wall and respirations.  Normal effort.  Clear to auscultation bilaterally, no wheezing, crackles or rhonchi.  No increased work of breathing.  No cough appreciated.  Cardiac: RRR with no murmurs  Abdomen: soft, nontender, no rigidity, no rebound tenderness or guarding, normal bowel sounds present  :  No suprapubic tenderness to palpation.  No CVA tenderness to palpation.  Scrotal/ exam: skin is of normal appearance without edema, erythema or other abnormalities noted. Testicles are even (no high riding testicle), there is no tenderness to palpation on examination of either testicle or penile region. No discharge noted. No other abnormalities noted on exam.   Musculoskeletal:    Thoracic/Lumbar Spine:  Inspection:    Lordosis: Normal   Kyphosis: Normal  Tenderness: left para lumbar muscles  ROM: normal lumbar flexion, extension, lateral rotation and bending  Strength: able to heel walk  Special Test: negative straight leg raises  Awake, alert, oriented to name, place and time.  Cranial nerves II-XII are grossly intact.  Motor is 5 out of 5 bilaterally.   Dermatological: no rashes noted of exposed skin  Psychological: normal affect, alert, oriented, and pleasant.     Labs:  UA; negative for signs of infection  GC: negative    Xray:  None     "

## 2021-08-24 NOTE — NURSING NOTE
"Chief Complaint   Patient presents with     Testicular/scrotal Pain     Back Pain     Left testicle has been tender for about 10 days. He states that the tenderness is on the bottom and back half of his testicle. Back pain has been going on for 7-10 days. He denies painful urination     Initial There were no vitals taken for this visit. Estimated body mass index is 24.91 kg/m  as calculated from the following:    Height as of 3/10/20: 1.88 m (6' 2\").    Weight as of 3/10/20: 88 kg (194 lb).     FOOD SECURITY SCREENING QUESTIONS  Hunger Vital Signs:  Within the past 12 months we worried whether our food would run out before we got money to buy more. Never  Within the past 12 months the food we bought just didn't last and we didn't have money to get more. Never      Medication Reconciliation: Complete      Pedro Love LPN   "

## 2021-10-09 ENCOUNTER — HEALTH MAINTENANCE LETTER (OUTPATIENT)
Age: 31
End: 2021-10-09

## 2022-01-29 ENCOUNTER — HEALTH MAINTENANCE LETTER (OUTPATIENT)
Age: 32
End: 2022-01-29

## 2022-09-17 ENCOUNTER — HEALTH MAINTENANCE LETTER (OUTPATIENT)
Age: 32
End: 2022-09-17

## 2022-12-14 ENCOUNTER — OFFICE VISIT (OUTPATIENT)
Dept: FAMILY MEDICINE | Facility: OTHER | Age: 32
End: 2022-12-14
Payer: COMMERCIAL

## 2022-12-14 ENCOUNTER — HOSPITAL ENCOUNTER (OUTPATIENT)
Dept: GENERAL RADIOLOGY | Facility: OTHER | Age: 32
Discharge: HOME | End: 2022-12-14
Payer: COMMERCIAL

## 2022-12-14 VITALS
DIASTOLIC BLOOD PRESSURE: 80 MMHG | OXYGEN SATURATION: 98 % | RESPIRATION RATE: 18 BRPM | BODY MASS INDEX: 24.97 KG/M2 | TEMPERATURE: 98.3 F | SYSTOLIC BLOOD PRESSURE: 134 MMHG | WEIGHT: 194.5 LBS | HEART RATE: 100 BPM

## 2022-12-14 DIAGNOSIS — R00.0 TACHYCARDIA, UNSPECIFIED: ICD-10-CM

## 2022-12-14 DIAGNOSIS — R06.09 DYSPNEA ON EXERTION: ICD-10-CM

## 2022-12-14 DIAGNOSIS — R05.1 ACUTE COUGH: ICD-10-CM

## 2022-12-14 DIAGNOSIS — R09.3 ABNORMAL COLOR OF SPUTUM: Primary | ICD-10-CM

## 2022-12-14 PROCEDURE — 71046 X-RAY EXAM CHEST 2 VIEWS: CPT

## 2022-12-14 PROCEDURE — 99213 OFFICE O/P EST LOW 20 MIN: CPT

## 2022-12-14 RX ORDER — AZITHROMYCIN 250 MG/1
TABLET, FILM COATED ORAL
Qty: 6 TABLET | Refills: 0 | Status: SHIPPED | OUTPATIENT
Start: 2022-12-14 | End: 2022-12-19

## 2022-12-14 RX ORDER — POLYMYXIN B SULFATE AND TRIMETHOPRIM 1; 10000 MG/ML; [USP'U]/ML
SOLUTION OPHTHALMIC
COMMUNITY
Start: 2022-08-24 | End: 2024-02-27

## 2022-12-14 RX ORDER — BENZONATATE 200 MG/1
200 CAPSULE ORAL 3 TIMES DAILY PRN
Qty: 45 CAPSULE | Refills: 0 | Status: SHIPPED | OUTPATIENT
Start: 2022-12-14 | End: 2022-12-29

## 2022-12-14 ASSESSMENT — PAIN SCALES - GENERAL: PAINLEVEL: NO PAIN (0)

## 2022-12-14 NOTE — PATIENT INSTRUCTIONS
Take antibiotic as ordered. I recommend taking daily probiotic while on the antibiotic. Follow up if symptoms worsen or fail to improve.

## 2022-12-14 NOTE — PROGRESS NOTES
ASSESSMENT/PLAN:    Differential Diagnoses:     (R09.3) Abnormal color of sputum  (primary encounter diagnosis); (R05.1) Acute cough, (R06.09) Dyspnea on exertion; (R00.0) Tachycardia (borderline).   Comment: Patient presents today with 3 weeks of symptoms, including intermittent fevers (afebrile today), worsening cough productive with brown sputum, and shortness of breath with activity. Vital signs are overall stable, however, he was borderline tachycardic. Oxygen sats good at 98%. Lungs are clear to auscultation, no wheezing or rhonchi. Based on longevity of symptoms and resickening with productive cough, fever, chills, myalgias, and shortness of breath I did recommend proceeding with chest XR to rule out pneumonia, patient was agreeable. Chest XR, returned clear, no infiltrates. I am concerned about patient's progressive symptoms and do recommend treating. Reviewed plan with patient who is in agreement. We reviewed conservative treatment options. In addition recommend antibiotics.   Plan: XR Chest 2 Views, azithromycin (ZITHROMAX) 250         MG tablet  Take antibiotic as ordered. Complete full dose.   benzonatate (TESSALON) 200 MG PRN at bedtime    Symptomatic treatment - Encouraged fluids, salt water gargles, honey (only if greater than 1 year in age due to risk of botulism), elevation, humidifier, sinus rinse/netti pot, lozenges, tea, topical vapor rub, popsicles, rest, etc     May use over-the-counter Tylenol or ibuprofen PRN    Discussed warning signs/symptoms indicative of need to f/u    Follow up if symptoms persist or worsen or concerns    I have reviewed the nursing notes.  I have reviewed the findings, diagnosis, plan and need for follow up with the patient.    I explained my diagnostic considerations and recommendations to the patient, who voiced understanding and agreement with the treatment plan. All questions were answered. We discussed potential side effects of any prescribed or recommended  "therapies, as well as expectations for response to treatments.    YARA REILLY, APRN CNP  12/14/2022  9:46 AM    HPI:    Damon Santiago is a 32 year old male  who presents to [facility] today for concerns of ongoing cough.    Patient notes onset of cough/viral illness that started on 11/24/22. Cough has persisted and not getting better. Productive sputum that is brown in color, this is mostly in the morning. Feverish feeling and chills started a week ago. He notes he has been more dyspneic with activity. He notes that a few nights ago he felt like he \"needed to catch his breath\" and was breathing fast. He notes he is otherwise an active person. He also endorses body aches over the past week. He has had pneumonia in the past. No history of asthma. No recent treatment with antibiotics.     He has been taking Delsym and Mucinex. He has also taken Dayquil and Nyquil. Nothing today.     Allergies: Sulfa.     Past Medical History:   Diagnosis Date     Immune thrombocytopenic purpura (H) 2/19/2018    Overview:  platelets 12,000, petechiae     Personal history of diseases of the blood and blood-forming organs and certain disorders involving the immune mechanism (CODE)     No Comments Provided     Personal history of other mental and behavioral disorders     No Comments Provided     Past Surgical History:   Procedure Laterality Date     CYSTOSCOPY      2016,WNL     Social History     Tobacco Use     Smoking status: Never     Smokeless tobacco: Never   Substance Use Topics     Alcohol use: Yes     Comment: Alcoholic Drinks/day: social     Current Outpatient Medications   Medication Sig Dispense Refill     Multiple Vitamin (MULTI-VITAMINS) TABS Take 1 tablet by mouth daily       sildenafil (REVATIO) 20 MG tablet Take 1-2 tablets (20-40 mg) by mouth daily as needed (ED) 40 tablet 5     trimethoprim-polymyxin b (POLYTRIM) 24609-7.1 UNIT/ML-% ophthalmic solution        Allergies   Allergen Reactions     Sulfa Drugs Unknown "     Past medical history, past surgical history, current medications and allergies reviewed and accurate to the best of my knowledge.      ROS:  Refer to HPI    /80 (BP Location: Left arm, Patient Position: Sitting, Cuff Size: Adult Regular)   Pulse 100   Temp 98.3  F (36.8  C) (Temporal)   Resp 18   Wt 88.2 kg (194 lb 8 oz)   SpO2 98%   BMI 24.97 kg/m      EXAM:  General Appearance: Well appearing 32 year old male, appropriate appearance for age. No acute distress   Ears: Left TM intact, translucent with bony landmarks appreciated, no erythema, no effusion, no bulging, no purulence.  Right TM intact, translucent with bony landmarks appreciated, no erythema, no effusion, no bulging, no purulence.  Left auditory canal clear.  Right auditory canal clear.  Normal external ears, non tender.  Eyes: conjunctivae normal without erythema or irritation, corneas clear, no drainage or crusting, no eyelid swelling, pupils equal   Oropharynx: moist mucous membranes, posterior pharynx with erythema, no exudates or petechiae, no post nasal drip seen, no trismus, voice clear.    Nose:  Bilateral nares: no erythema, no edema, no drainage, with congestion   Neck: supple without adenopathy  Respiratory: normal chest wall and respirations.  Normal effort.  Clear to auscultation bilaterally, no wheezing, crackles or rhonchi.  No increased work of breathing. Cough appreciated.  Cardiac: Normal rhythm rate borderline tachycardic.   Abdomen: soft, nontender, no rigidity, no rebound tenderness or guarding, normal bowel sounds present  Musculoskeletal:  Equal movement of bilateral upper extremities.  Equal movement of bilateral lower extremities.  Normal gait.   Dermatological: no rashes noted of exposed skin  Neuro: Alert and oriented to person, place, and time.  Cranial nerves II-XII grossly intact with no focal or lateralizing deficits.  Muscle tone normal.  Gait normal. No tremor.   Psychological: normal affect, alert,  oriented, and pleasant.     Xray:  Results for orders placed or performed in visit on 12/14/22   XR Chest 2 Views     Status: None    Narrative    Exam:  XR CHEST 2 VIEWS    HISTORY: Abnormal color of sputum; Acute cough; Dyspnea on exertion.    COMPARISON:  9/5/2019    FINDINGS:     The cardiomediastinal contours are normal.      No focal consolidation, effusion, or pneumothorax.      No acute osseous abnormality.       Impression    IMPRESSION:      No acute cardiopulmonary process.      AZRA MAGAÑA MD         SYSTEM ID:  [id number]

## 2022-12-14 NOTE — NURSING NOTE
"Chief Complaint   Patient presents with     Cough     Patient presents to clinic for cough, sob and aches. He states this has been ongoing since the day after Thanksgiving and he just can't rid of the cough.     Initial /80 (BP Location: Left arm, Patient Position: Sitting, Cuff Size: Adult Regular)   Pulse 100   Temp 98.3  F (36.8  C) (Temporal)   Resp 18   Wt 88.2 kg (194 lb 8 oz)   SpO2 98%   BMI 24.97 kg/m   Estimated body mass index is 24.97 kg/m  as calculated from the following:    Height as of 8/24/21: 1.88 m (6' 2\").    Weight as of this encounter: 88.2 kg (194 lb 8 oz).  Medication Reconciliation: complete        Thuy Whalen  "

## 2022-12-14 NOTE — PROGRESS NOTES
ASSESSMENT/PLAN:    Differential Diagnoses: ***    I have reviewed the nursing notes.  I have reviewed the findings, diagnosis, plan and need for follow up with the patient.    {Ivana Picklist:[id number]}    ***   Discussed with patient viral vs bacterial respiratory illness, and evidence based practice and guidelines for cough without fever or infiltrate on xray are not indicative of pneumonia and should not be treated with antibiotics.    *** Discussed with patient that symptoms and exam are consistent with viral illness.  Discussed that symptomatic treatment of cough is appropriate but not with antibiotics.      *** Symptomatic treatment - Encouraged fluids, salt water gargles, honey (only if greater than 1 year in age due to risk of botulism), elevation, humidifier, sinus rinse/netti pot, lozenges, tea, topical vapor rub, popsicles, rest, etc     *** May use over-the-counter Tylenol or ibuprofen PRN    Discussed warning signs/symptoms indicative of need to f/u    Follow up if symptoms persist or worsen or concerns    I explained my diagnostic considerations and recommendations to the patient, who voiced understanding and agreement with the treatment plan. All questions were answered. We discussed potential side effects of any prescribed or recommended therapies, as well as expectations for response to treatments.    SHIRA PA CNP  12/14/2022  9:43 AM    HPI:    Damon Santiago is a 32 year old male  who presents to [facility] today for concerns of productive cough.        Past Medical History:   Diagnosis Date     Immune thrombocytopenic purpura (H) 2/19/2018    Overview:  platelets 12,000, petechiae     Personal history of diseases of the blood and blood-forming organs and certain disorders involving the immune mechanism (CODE)     No Comments Provided     Personal history of other mental and behavioral disorders     No Comments Provided     Past Surgical History:   Procedure Laterality Date      CYSTOSCOPY      2016,WNL     Social History     Tobacco Use     Smoking status: Never     Smokeless tobacco: Never   Substance Use Topics     Alcohol use: Yes     Comment: Alcoholic Drinks/day: social     Current Outpatient Medications   Medication Sig Dispense Refill     Multiple Vitamin (MULTI-VITAMINS) TABS Take 1 tablet by mouth daily       sildenafil (REVATIO) 20 MG tablet Take 1-2 tablets (20-40 mg) by mouth daily as needed (ED) 40 tablet 5     trimethoprim-polymyxin b (POLYTRIM) 86234-2.1 UNIT/ML-% ophthalmic solution        Allergies   Allergen Reactions     Sulfa Drugs Unknown     Past medical history, past surgical history, current medications and allergies reviewed and accurate to the best of my knowledge.      ROS:  Refer to HPI    /80 (BP Location: Left arm, Patient Position: Sitting, Cuff Size: Adult Regular)   Pulse 100   Temp 98.3  F (36.8  C) (Temporal)   Resp 18   Wt 88.2 kg (194 lb 8 oz)   SpO2 98%   BMI 24.97 kg/m      EXAM:  General Appearance: Well appearing 32 year old male, appropriate appearance for age. No acute distress   Ears: Left TM not visualized due to cerumen impaction.  Right TM intact, translucent with bony landmarks appreciated, no erythema, no effusion, no bulging, no purulence.  Left auditory canal clear.  Right auditory canal clear.  Normal external ears, non tender.  Eyes: conjunctivae normal without erythema or irritation, corneas clear, no drainage or crusting, no eyelid swelling, pupils equal   Oropharynx: moist mucous membranes, posterior pharynx with erythema, no exudates or petechiae, no post nasal drip seen, no trismus, voice clear.    Sinuses:  No sinus tenderness upon palpation of the frontal or maxillary sinuses  Nose:  Bilateral nares: no erythema, no edema, no drainage or congestion   Neck: supple without adenopathy  Respiratory: normal chest wall and respirations.  Normal effort.  Clear to auscultation bilaterally, no wheezing, crackles or rhonchi.  No  increased work of breathing.  No cough appreciated.  Cardiac: RRR with no murmurs  Abdomen: soft, nontender, no rigidity, no rebound tenderness or guarding, normal bowel sounds present   Musculoskeletal:  Equal movement of bilateral upper extremities.  Equal movement of bilateral lower extremities.  Normal gait.    Dermatological: no rashes noted of exposed skin  Neuro: Alert and oriented to person, place, and time.  Cranial nerves II-XII grossly intact with no focal or lateralizing deficits.  Muscle tone normal.  Gait normal. No tremor.   Psychological: normal affect, alert, oriented, and pleasant.     Labs:  ***    Xray:  ***

## 2023-05-06 ENCOUNTER — HEALTH MAINTENANCE LETTER (OUTPATIENT)
Age: 33
End: 2023-05-06

## 2024-01-31 ENCOUNTER — VIRTUAL VISIT (OUTPATIENT)
Dept: UROLOGY | Facility: OTHER | Age: 34
End: 2024-01-31
Attending: INTERNAL MEDICINE
Payer: COMMERCIAL

## 2024-01-31 VITALS
HEART RATE: 68 BPM | TEMPERATURE: 97.6 F | OXYGEN SATURATION: 100 % | RESPIRATION RATE: 16 BRPM | DIASTOLIC BLOOD PRESSURE: 100 MMHG | SYSTOLIC BLOOD PRESSURE: 142 MMHG

## 2024-01-31 DIAGNOSIS — Z30.09 ENCOUNTER FOR VASECTOMY COUNSELING: Primary | ICD-10-CM

## 2024-01-31 DIAGNOSIS — Z86.2 HISTORY OF IDIOPATHIC THROMBOCYTOPENIC PURPURA: ICD-10-CM

## 2024-01-31 LAB
ERYTHROCYTE [DISTWIDTH] IN BLOOD BY AUTOMATED COUNT: 11.9 % (ref 10–15)
HCT VFR BLD AUTO: 45.3 % (ref 40–53)
HGB BLD-MCNC: 15.6 G/DL (ref 13.3–17.7)
MCH RBC QN AUTO: 31.6 PG (ref 26.5–33)
MCHC RBC AUTO-ENTMCNC: 34.4 G/DL (ref 31.5–36.5)
MCV RBC AUTO: 92 FL (ref 78–100)
PLATELET # BLD AUTO: 192 10E3/UL (ref 150–450)
RBC # BLD AUTO: 4.94 10E6/UL (ref 4.4–5.9)
WBC # BLD AUTO: 4.9 10E3/UL (ref 4–11)

## 2024-01-31 PROCEDURE — 36415 COLL VENOUS BLD VENIPUNCTURE: CPT | Mod: ZL | Performed by: UROLOGY

## 2024-01-31 PROCEDURE — 85027 COMPLETE CBC AUTOMATED: CPT | Mod: ZL | Performed by: UROLOGY

## 2024-01-31 PROCEDURE — 99202 OFFICE O/P NEW SF 15 MIN: CPT | Mod: 95 | Performed by: UROLOGY

## 2024-01-31 RX ORDER — OMEGA-3/DHA/EPA/FISH OIL 60 MG-90MG
2 CAPSULE ORAL DAILY
COMMUNITY
Start: 2024-01-17

## 2024-01-31 ASSESSMENT — PAIN SCALES - GENERAL: PAINLEVEL: NO PAIN (0)

## 2024-01-31 NOTE — PATIENT INSTRUCTIONS
Discussion with patient about the procedure and the technique used.  Explained to the patient that vasectomy is intended to be a permanent form of contraception and may not be reversible. Risks explained including bleeding with hematoma formation of 1-2%, infection 1-2%, injury to testis with loss of testis, failure rate of 2% requiring repeat vasectomy, 1-2% risk of chronic pain which may require additional surgery to correct, hypogonadism, erectile dysfunction (<2%), and a slight decrease in the volume of ejaculate.  Risk of pregnancy after vasectomy of 1 in 2000 for men who have post-vasectomy azoospermia or rare non-motile sperm explained to patient.  Patient given the option of banking sperm.      Spoke about after care including avoiding blood thinners such as aspirin, NSAIDS 10 days prior and 2 weeks after.  Patient advised to take it easy for a least a week with limited activity and relaxation.  Ice and elevation recommended and explained.  Good scrotal support recommended.  Patient advised to continue birth control until told he is sterile.  No sex for 1 week after procedure.  No soaking for 2 weeks and may shower lightly in 2 days.  Neosporin TID to incisions until healed.  Advised to call with fever chills or concerns.      Patient voiced an understanding.

## 2024-01-31 NOTE — NURSING NOTE
"REVIEW OF SYSTEMS:  Skin: negative  Eyes: negative  Ears/Nose/Throat: negative  Respiratory: No shortness of breath, dyspnea on exertion, cough, or hemoptysis  Cardiovascular: negative  Gastrointestinal: negative  Genitourinary: negative  Musculoskeletal: negative  Neurologic: negative  Psychiatric: positive for sleep disturbance and anxiety  Hematologic/Lymphatic/Immunologic: negative  Endocrine: negative  AUA: 1  Chief Complaint   Patient presents with    Consult     Vasectomy       Initial BP (!) 142/100   Pulse 68   Temp 97.6  F (36.4  C)   Resp 16   SpO2 100%  Estimated body mass index is 24.97 kg/m  as calculated from the following:    Height as of 8/24/21: 1.88 m (6' 2\").    Weight as of 12/14/22: 88.2 kg (194 lb 8 oz).  Medication Reconciliation: complete    Tamia Velarde RN    "

## 2024-01-31 NOTE — RESULT ENCOUNTER NOTE
Nicole, we let Damon know that his platelets are stable and acceptable for vasectomy.  We will plan on doing it as discussed.  Thanks Javier

## 2024-01-31 NOTE — PROGRESS NOTES
Type of service:  Video Visit   Video Visit Start Time: 8:43  Video Visit End Time: 8:58    Originating Location (pt. Location): [facility]  Distant Location (provider location): [place], [place]  Platform used for Video Visit: Epic Virtual Visit Platform    I have reviewed the note as documented above.  This accurately captures the substance of my virtual visit with the patient. The patient states an understanding and is agreeable with the plan.   Esau France MD   Urology     It was my pleasure to meet Mr. Damon Santiago, a 34 year old year old male seen in consultation today for Chief Complaint: Consult (Vasectomy)  .    HPI: Mr. Damon Santiago is a 34 year old year old male who presents today January 31, 2024 virtually for evaluation of vasectomy counseling.    Denies any previous history of scrotal or inguinal surgery.  Denies blood thinner use other than fish oil.      History of immune thrombocytopenic purpura at age 18.  Platelets were 12,000 at that point.  Was placed on steroids and things normalized.       Past Medical History:   Diagnosis Date    Immune thrombocytopenic purpura (H) 2/19/2018    Overview:  platelets 12,000, petechiae    Personal history of diseases of the blood and blood-forming organs and certain disorders involving the immune mechanism (CODE)     No Comments Provided    Personal history of other mental and behavioral disorders     No Comments Provided       Past Surgical History:   Procedure Laterality Date    CYSTOSCOPY      2016,WNL       FAMILY HISTORY: Denies a family history of prostate cancer.      SOCIAL HISTORY:    reports that he has never smoked. He has never used smokeless tobacco.    Current Outpatient Medications   Medication Sig Dispense Refill    fish oil-omega-3 fatty acids 500 MG capsule Take 2 capsules by mouth daily      Multiple Vitamin (MULTI-VITAMINS) TABS Take 1 tablet by mouth daily      sildenafil (REVATIO) 20 MG tablet Take 1-2 tablets  "(20-40 mg) by mouth daily as needed (ED) 40 tablet 5    trimethoprim-polymyxin b (POLYTRIM) 13249-5.1 UNIT/ML-% ophthalmic solution  (Patient not taking: Reported on 1/31/2024)         ALLERGIES: Sulfa antibiotics      REVIEW OF SYSTEMS:  Skin: negative  Eyes: negative  Ears/Nose/Throat: negative  Respiratory: No shortness of breath, dyspnea on exertion, cough, or hemoptysis  Cardiovascular: negative  Gastrointestinal: negative  Genitourinary: negative  Musculoskeletal: negative  Neurologic: negative  Psychiatric: positive for sleep disturbance and anxiety  Hematologic/Lymphatic/Immunologic: negative  Endocrine: negative  AUA: 1  GENERAL PHYSICAL EXAM:   Vitals: BP (!) 142/100   Pulse 68   Temp 97.6  F (36.4  C)   Resp 16   SpO2 100%   There is no height or weight on file to calculate BMI.    GENERAL: Well groomed, well developed, well nourished male in NAD.  HEAD: Normocephalic.   ENT: No jaundice   RESPIRATORY: Normal respiratory effort.    MS: Visibly moving UE well.   NEURO: Alert and oriented x 3.  PSYCH: Normal mood and affect, pleasant and agreeable during interview and exam.    LABS: The last test results for Ms. Damon Santiago were reviewed.   Results for orders placed or performed in visit on 01/31/24 (from the past 24 hour(s))   CBC with platelets   Result Value Ref Range    WBC Count 4.9 4.0 - 11.0 10e3/uL    RBC Count 4.94 4.40 - 5.90 10e6/uL    Hemoglobin 15.6 13.3 - 17.7 g/dL    Hematocrit 45.3 40.0 - 53.0 %    MCV 92 78 - 100 fL    MCH 31.6 26.5 - 33.0 pg    MCHC 34.4 31.5 - 36.5 g/dL    RDW 11.9 10.0 - 15.0 %    Platelet Count 192 150 - 450 10e3/uL       PSA - No results found for: \"PSA\"  BMP -   Recent Labs   Lab Test 03/10/20  0900 01/31/17  0943    142   POTASSIUM 4.1 4.2   CHLORIDE 103 103   CO2 28 28   BUN 20 23   CR 1.06 1.01   * 93   MILAN 9.5 9.5       CBC -   Recent Labs   Lab Test 01/31/24  0929 03/10/20  0900 01/31/17  0923   WBC 4.9 4.1  --    HGB 15.6 15.2 15.5    " 192 188       ASSESSMENT:   Encounter for vasectomy counseling  History of thrombocytopenic purpura    PLAN:   Discussion with patient about the procedure and the technique used.  Explained to the patient that vasectomy is intended to be a permanent form of contraception and may not be reversible. Risks explained including bleeding with hematoma formation of 1-2%, infection 1-2%, injury to testis with loss of testis, failure rate of 2% requiring repeat vasectomy, 1-2% risk of chronic pain which may require additional surgery to correct, hypogonadism, erectile dysfunction (<2%), and a slight decrease in the volume of ejaculate.  Risk of pregnancy after vasectomy of 1 in 2000 for men who have post-vasectomy azoospermia or rare non-motile sperm explained to patient.  Patient given the option of banking sperm.      Spoke about after care including avoiding blood thinners such as aspirin, NSAIDS 10 days prior and 2 weeks after.  Patient advised to take it easy for a least a week with limited activity and relaxation.  Ice and elevation recommended and explained.  Good scrotal support recommended.  Patient advised to continue birth control until told he is sterile.  No sex for 1 week after procedure.  No soaking for 2 weeks and may shower lightly in 2 days.  Neosporin TID to incisions until healed.  Advised to call with fever chills or concerns.      Patient voiced an understanding.     15 minutes spent on the date of this encounter doing chart review, history and exam, documentation and further activities as noted above.      Esau France MD   [facility]

## 2024-02-24 SDOH — HEALTH STABILITY: PHYSICAL HEALTH: ON AVERAGE, HOW MANY MINUTES DO YOU ENGAGE IN EXERCISE AT THIS LEVEL?: 20 MIN

## 2024-02-24 SDOH — HEALTH STABILITY: PHYSICAL HEALTH: ON AVERAGE, HOW MANY DAYS PER WEEK DO YOU ENGAGE IN MODERATE TO STRENUOUS EXERCISE (LIKE A BRISK WALK)?: 3 DAYS

## 2024-02-24 ASSESSMENT — SOCIAL DETERMINANTS OF HEALTH (SDOH): HOW OFTEN DO YOU GET TOGETHER WITH FRIENDS OR RELATIVES?: ONCE A WEEK

## 2024-02-27 ENCOUNTER — OFFICE VISIT (OUTPATIENT)
Dept: FAMILY MEDICINE | Facility: OTHER | Age: 34
End: 2024-02-27
Attending: PHYSICIAN ASSISTANT
Payer: COMMERCIAL

## 2024-02-27 VITALS
HEIGHT: 74 IN | DIASTOLIC BLOOD PRESSURE: 74 MMHG | BODY MASS INDEX: 23.61 KG/M2 | HEART RATE: 100 BPM | OXYGEN SATURATION: 98 % | TEMPERATURE: 97.3 F | RESPIRATION RATE: 20 BRPM | WEIGHT: 184 LBS | SYSTOLIC BLOOD PRESSURE: 124 MMHG

## 2024-02-27 DIAGNOSIS — Z13.220 LIPID SCREENING: ICD-10-CM

## 2024-02-27 DIAGNOSIS — Z23 NEED FOR PROPHYLACTIC VACCINATION AGAINST DIPHTHERIA AND TETANUS: ICD-10-CM

## 2024-02-27 DIAGNOSIS — Z82.49 FAMILY HISTORY OF ISCHEMIC HEART DISEASE: ICD-10-CM

## 2024-02-27 DIAGNOSIS — Z00.00 ROUTINE GENERAL MEDICAL EXAMINATION AT A HEALTH CARE FACILITY: Primary | ICD-10-CM

## 2024-02-27 DIAGNOSIS — N52.8 OTHER MALE ERECTILE DYSFUNCTION: ICD-10-CM

## 2024-02-27 DIAGNOSIS — L98.9 SKIN LESION: ICD-10-CM

## 2024-02-27 DIAGNOSIS — Z00.00 ENCOUNTER FOR MEDICAL EXAMINATION TO ESTABLISH CARE: ICD-10-CM

## 2024-02-27 DIAGNOSIS — Z23 NEED FOR DTP, HIB CONJUGATE AND HEPATITIS B VACCINE: ICD-10-CM

## 2024-02-27 DIAGNOSIS — R59.0 INGUINAL LYMPHADENOPATHY: ICD-10-CM

## 2024-02-27 LAB
ALBUMIN SERPL BCG-MCNC: 4.8 G/DL (ref 3.5–5.2)
ALP SERPL-CCNC: 71 U/L (ref 40–150)
ALT SERPL W P-5'-P-CCNC: 18 U/L (ref 0–70)
ANION GAP SERPL CALCULATED.3IONS-SCNC: 10 MMOL/L (ref 7–15)
AST SERPL W P-5'-P-CCNC: 18 U/L (ref 0–45)
BILIRUB SERPL-MCNC: 0.6 MG/DL
BUN SERPL-MCNC: 17.4 MG/DL (ref 6–20)
CALCIUM SERPL-MCNC: 9.8 MG/DL (ref 8.6–10)
CHLORIDE SERPL-SCNC: 103 MMOL/L (ref 98–107)
CHOLEST SERPL-MCNC: 121 MG/DL
CREAT SERPL-MCNC: 0.99 MG/DL (ref 0.67–1.17)
DEPRECATED HCO3 PLAS-SCNC: 28 MMOL/L (ref 22–29)
EGFRCR SERPLBLD CKD-EPI 2021: >90 ML/MIN/1.73M2
FASTING STATUS PATIENT QL REPORTED: NO
GLUCOSE SERPL-MCNC: 105 MG/DL (ref 70–99)
HDLC SERPL-MCNC: 35 MG/DL
LDLC SERPL CALC-MCNC: 68 MG/DL
NONHDLC SERPL-MCNC: 86 MG/DL
POTASSIUM SERPL-SCNC: 4.1 MMOL/L (ref 3.4–5.3)
PROT SERPL-MCNC: 7.9 G/DL (ref 6.4–8.3)
SODIUM SERPL-SCNC: 141 MMOL/L (ref 135–145)
TRIGL SERPL-MCNC: 89 MG/DL

## 2024-02-27 PROCEDURE — 82040 ASSAY OF SERUM ALBUMIN: CPT | Mod: ZL | Performed by: PHYSICIAN ASSISTANT

## 2024-02-27 PROCEDURE — 99214 OFFICE O/P EST MOD 30 MIN: CPT | Mod: 25 | Performed by: PHYSICIAN ASSISTANT

## 2024-02-27 PROCEDURE — 90472 IMMUNIZATION ADMIN EACH ADD: CPT | Performed by: PHYSICIAN ASSISTANT

## 2024-02-27 PROCEDURE — 99395 PREV VISIT EST AGE 18-39: CPT | Mod: 25 | Performed by: PHYSICIAN ASSISTANT

## 2024-02-27 PROCEDURE — 90715 TDAP VACCINE 7 YRS/> IM: CPT | Performed by: PHYSICIAN ASSISTANT

## 2024-02-27 PROCEDURE — 36415 COLL VENOUS BLD VENIPUNCTURE: CPT | Mod: ZL | Performed by: PHYSICIAN ASSISTANT

## 2024-02-27 PROCEDURE — 90471 IMMUNIZATION ADMIN: CPT | Performed by: PHYSICIAN ASSISTANT

## 2024-02-27 PROCEDURE — 90746 HEPB VACCINE 3 DOSE ADULT IM: CPT | Performed by: PHYSICIAN ASSISTANT

## 2024-02-27 PROCEDURE — 82465 ASSAY BLD/SERUM CHOLESTEROL: CPT | Mod: ZL | Performed by: PHYSICIAN ASSISTANT

## 2024-02-27 ASSESSMENT — ANXIETY QUESTIONNAIRES
IF YOU CHECKED OFF ANY PROBLEMS ON THIS QUESTIONNAIRE, HOW DIFFICULT HAVE THESE PROBLEMS MADE IT FOR YOU TO DO YOUR WORK, TAKE CARE OF THINGS AT HOME, OR GET ALONG WITH OTHER PEOPLE: SOMEWHAT DIFFICULT
GAD7 TOTAL SCORE: 1
8. IF YOU CHECKED OFF ANY PROBLEMS, HOW DIFFICULT HAVE THESE MADE IT FOR YOU TO DO YOUR WORK, TAKE CARE OF THINGS AT HOME, OR GET ALONG WITH OTHER PEOPLE?: SOMEWHAT DIFFICULT
7. FEELING AFRAID AS IF SOMETHING AWFUL MIGHT HAPPEN: NOT AT ALL
1. FEELING NERVOUS, ANXIOUS, OR ON EDGE: NOT AT ALL
6. BECOMING EASILY ANNOYED OR IRRITABLE: SEVERAL DAYS
2. NOT BEING ABLE TO STOP OR CONTROL WORRYING: NOT AT ALL
7. FEELING AFRAID AS IF SOMETHING AWFUL MIGHT HAPPEN: NOT AT ALL
3. WORRYING TOO MUCH ABOUT DIFFERENT THINGS: NOT AT ALL
5. BEING SO RESTLESS THAT IT IS HARD TO SIT STILL: NOT AT ALL
4. TROUBLE RELAXING: NOT AT ALL
GAD7 TOTAL SCORE: 1
GAD7 TOTAL SCORE: 1

## 2024-02-27 ASSESSMENT — PATIENT HEALTH QUESTIONNAIRE - PHQ9
SUM OF ALL RESPONSES TO PHQ QUESTIONS 1-9: 0
10. IF YOU CHECKED OFF ANY PROBLEMS, HOW DIFFICULT HAVE THESE PROBLEMS MADE IT FOR YOU TO DO YOUR WORK, TAKE CARE OF THINGS AT HOME, OR GET ALONG WITH OTHER PEOPLE: NOT DIFFICULT AT ALL
SUM OF ALL RESPONSES TO PHQ QUESTIONS 1-9: 0

## 2024-02-27 ASSESSMENT — PAIN SCALES - GENERAL: PAINLEVEL: NO PAIN (0)

## 2024-02-27 NOTE — PATIENT INSTRUCTIONS
Preventive Care Advice   This is general advice given by our system to help you stay healthy. However, your care team may have specific advice just for you. Please talk to your care team about your preventive care needs.  Nutrition  Eat 5 or more servings of fruits and vegetables each day.  Try wheat bread, brown rice and whole grain pasta (instead of white bread, rice, and pasta).  Get enough calcium and vitamin D. Check the label on foods and aim for 100% of the RDA (recommended daily allowance).  Lifestyle  Exercise at least 150 minutes each week   (30 minutes a day, 5 days a week).  Do muscle strengthening activities 2 days a week. These help control your weight and prevent disease.  No smoking.  Wear sunscreen to prevent skin cancer.  Have a dental exam and cleaning every 6 months.  Yearly exams  See your health care team every year to talk about:  Any changes in your health.  Any medicines your care team has prescribed.  Preventive care, family planning, and ways to prevent chronic diseases.  Shots (vaccines)   HPV shots (up to age 26), if you've never had them before.  Hepatitis B shots (up to age 59), if you've never had them before.  COVID-19 shot: Get this shot when it's due.  Flu shot: Get a flu shot every year.  Tetanus shot: Get a tetanus shot every 10 years.  Pneumococcal, hepatitis A, and RSV shots: Ask your care team if you need these based on your risk.  Shingles shot (for age 50 and up).  General health tests  Diabetes screening:  Starting at age 35, Get screened for diabetes at least every 3 years.  If you are younger than age 35, ask your care team if you should be screened for diabetes.  Cholesterol test: At age 39, start having a cholesterol test every 5 years, or more often if advised.  Bone density scan (DEXA): At age 50, ask your care team if you should have this scan for osteoporosis (brittle bones).  Hepatitis C: Get tested at least once in your life.  STIs (sexually transmitted  infections)  Before age 24: Ask your care team if you should be screened for STIs.  After age 24: Get screened for STIs if you're at risk. You are at risk for STIs (including HIV) if:  You are sexually active with more than one person.  You don't use condoms every time.  You or a partner was diagnosed with a sexually transmitted infection.  If you are at risk for HIV, ask about PrEP medicine to prevent HIV.  Get tested for HIV at least once in your life, whether you are at risk for HIV or not.  Cancer screening tests  Cervical cancer screening: If you have a cervix, begin getting regular cervical cancer screening tests at age 21. Most people who have regular screenings with normal results can stop after age 65. Talk about this with your provider.  Breast cancer scan (mammogram): If you've ever had breasts, begin having regular mammograms starting at age 40. This is a scan to check for breast cancer.  Colon cancer screening: It is important to start screening for colon cancer at age 45.  Have a colonoscopy test every 10 years (or more often if you're at risk) Or, ask your provider about stool tests like a FIT test every year or Cologuard test every 3 years.  To learn more about your testing options, visit: https://[email or web address]/[email or web address].  For help making a decision, visit: https://bit.ly/br57858.  Prostate cancer screening test: If you have a prostate and are age 55 to 69, ask your provider if you would benefit from a yearly prostate cancer screening test.  Lung cancer screening: If you are a current or former smoker age 50 to 80, ask your care team if ongoing lung cancer screenings are right for you.  For informational purposes only. Not to replace the advice of your health care provider. Copyright   2023 [employer]. All rights reserved. Clinically reviewed by the [facility]. [employer] 491380 - REV 01/24.    Learning About Stress  What is stress?     Stress is your  body's response to a hard situation. Your body can have a physical, emotional, or mental response. Stress is a fact of life for most people, and it affects everyone differently. What causes stress for you may not be stressful for someone else.  A lot of things can cause stress. You may feel stress when you go on a job interview, take a test, or run a race. This kind of short-term stress is normal and even useful. It can help you if you need to work hard or react quickly. For example, stress can help you finish an important job on time.  Long-term stress is caused by ongoing stressful situations or events. Examples of long-term stress include long-term health problems, ongoing problems at work, or conflicts in your family. Long-term stress can harm your health.  How does stress affect your health?  When you are stressed, your body responds as though you are in danger. It makes hormones that speed up your heart, make you breathe faster, and give you a burst of energy. This is called the fight-or-flight stress response. If the stress is over quickly, your body goes back to normal and no harm is done.  But if stress happens too often or lasts too long, it can have bad effects. Long-term stress can make you more likely to get sick, and it can make symptoms of some diseases worse. If you tense up when you are stressed, you may develop neck, shoulder, or low back pain. Stress is linked to high blood pressure and heart disease.  Stress also harms your emotional health. It can make you núñez, tense, or depressed. Your relationships may suffer, and you may not do well at work or school.  What can you do to manage stress?  You can try these things to help manage stress:   Do something active. Exercise or activity can help reduce stress. Walking is a great way to get started. Even everyday activities such as housecleaning or yard work can help.  Try yoga or mariel chi. These techniques combine exercise and meditation. You may need  some training at first to learn them.  Do something you enjoy. For example, listen to music or go to a movie. Practice your hobby or do volunteer work.  Meditate. This can help you relax, because you are not worrying about what happened before or what may happen in the future.  Do guided imagery. Imagine yourself in any setting that helps you feel calm. You can use online videos, books, or a teacher to guide you.  Do breathing exercises. For example:  From a standing position, bend forward from the waist with your knees slightly bent. Let your arms dangle close to the floor.  Breathe in slowly and deeply as you return to a standing position. Roll up slowly and lift your head last.  Hold your breath for just a few seconds in the standing position.  Breathe out slowly and bend forward from the waist.  Let your feelings out. Talk, laugh, cry, and express anger when you need to. Talking with supportive friends or family, a counselor, or a yuval leader about your feelings is a healthy way to relieve stress. Avoid discussing your feelings with people who make you feel worse.  Write. It may help to write about things that are bothering you. This helps you find out how much stress you feel and what is causing it. When you know this, you can find better ways to cope.  What can you do to prevent stress?  You might try some of these things to help prevent stress:  Manage your time. This helps you find time to do the things you want and need to do.  Get enough sleep. Your body recovers from the stresses of the day while you are sleeping.  Get support. Your family, friends, and community can make a difference in how you experience stress.  Limit your news feed. Avoid or limit time on social media or news that may make you feel stressed.  Do something active. Exercise or activity can help reduce stress. Walking is a great way to get started.  Where can you learn more?  Go to https://www.healthwise.net/patiented  Enter N032 in the  "search box to learn more about \"Learning About Stress.\"  Current as of: February 26, 2023               Content Version: 13.8    3403-[address].   Care instructions adapted under license by your healthcare professional. If you have questions about a medical condition or this instruction, always ask your healthcare professional. [employer] disclaims any warranty or liability for your use of this information.      Learning About Alcohol Use Disorder  What is alcohol use disorder?  Alcohol use disorder means that a person drinks alcohol even though it causes harm to themselves or others. It can range from mild to severe. The more symptoms of this disorder you have, the more severe it may be. People who have it may find it hard to control their use of alcohol.  People who have this disorder may argue with others about how much they're drinking. Their job may be affected because of drinking. They may drink when it's dangerous or illegal, such as when they drive. They also may have a strong need, or craving, to drink. They may feel like they must drink just to get by. Their drinking may increase their risk of getting hurt or being in a car crash.  Over time, drinking too much alcohol may cause health problems. These may include high blood pressure, liver problems, or problems with digestion.  What are the symptoms?  Maybe you've wondered about your alcohol habits or how to tell if your drinking is becoming a problem.  Here are some of the symptoms of alcohol use disorder. You may have it if you have two or more of the following symptoms:  You drink larger amounts of alcohol than you ever meant to. Or you've been drinking for a longer time than you ever meant to.  You can't cut down or control your use. Or you constantly wish you could cut down.  You spend a lot of time getting or drinking alcohol or recovering from its effects.  You have strong cravings for alcohol.  You can no longer do " your main jobs at work, at school, or at home.  You keep drinking alcohol, even though your use hurts your relationships.  You have stopped doing important activities because of your alcohol use.  You drink alcohol in situations where doing so is dangerous.  You keep drinking alcohol even though you know it's causing health problems.  You need more and more alcohol to get the same effect, or you get less effect from the same amount over time. This is called tolerance.  You have uncomfortable symptoms when you stop drinking alcohol or use less. This is called withdrawal.  Alcohol use disorder can range from mild to severe. The more symptoms you have, the more severe the disorder may be.  You might not realize that your drinking is a problem. You might not drink large amounts when you drink. Or you might go for days or weeks between drinking episodes. But even if you don't drink very often, your drinking could still be harmful and put you at risk.  How is alcohol use disorder treated?  Getting help is up to you. But you don't have to do it alone. There are many people and kinds of treatments that can help.  Treatment for alcohol use disorder can include:  Group therapy, one or more types of counseling, and alcohol education.  Medicines that help to:  Reduce withdrawal symptoms and help you safely stop drinking.  Reduce cravings for alcohol.  Support groups. These groups include Alcoholics Anonymous and [employer] (Self-Management and Recovery Training).  Some people are able to stop or cut back on drinking with help from a counselor. People who have moderate to severe alcohol use disorder may need medical treatment. They may need to stay in a hospital or treatment center.  You may have a treatment team to help you. This team may include a psychologist or psychiatrist, counselors, doctors, social workers, nurses, and a . A  helps plan and manage your treatment.  Follow-up care is a key part  "of your treatment and safety. Be sure to make and go to all appointments, and call your doctor if you are having problems. It's also a good idea to know your test results and keep a list of the medicines you take.  Where can you learn more?  Go to https://www.[employer].net/patiented  Enter H758 in the search box to learn more about \"Learning About Alcohol Use Disorder.\"  Current as of: March 21, 2023               Content Version: 13.8    8522-7265 [employer].   Care instructions adapted under license by your healthcare professional. If you have questions about a medical condition or this instruction, always ask your healthcare professional. [employer] disclaims any warranty or liability for your use of this information.      "

## 2024-02-27 NOTE — PROGRESS NOTES
Preventive Care Visit  [facility]  Nedra Crabtree PA-C, Family Medicine  Feb 27, 2024    Assessment & Plan       ICD-10-CM    1. Routine general medical examination at a health care facility  Z00.00       2. Other male erectile dysfunction  N52.8       3. Need for prophylactic vaccination against diphtheria and tetanus  Z23 GH IMM - TDAP (ADACEL, BOOSTRIX)      4. Need for DTP, Hib conjugate and hepatitis B vaccine  Z23 GH IMM-  HEPATITIS B VACCINE, ADULT, IM      5. Lipid screening  Z13.220 Comprehensive Metabolic Panel     Lipid Panel     Comprehensive Metabolic Panel     Lipid Panel      6. Inguinal lymphadenopathy  R59.0       7. Skin lesion  L98.9       8. Family history of ischemic heart disease  Z82.49       9. Encounter for medical examination to establish care  Z00.00         Annual physical completed today.  Advance care planning discussed.  Update HIV and hepatitis C screening in low risk patient at next physical. Kindly defer COVID and Influenza immunizations.   Erectile dysfunction, stable, recently met with Dr. France in regards to vasectomy, has upcoming visit in April 2024.  Due for tetanus immunization, last administered in 2013. Vaccine counseling performed today. No previous immunization reactions. No recent or current illness. Vaccine kindly administered by nursing staff today.   Due for dose 3/3 of hepatitis B immunizations, doses #1-#2 received in childhood. Vaccine counseling performed today. No previous immunization reactions. No recent or current illness. Vaccine kindly administered by nursing staff today.   Lipid screening updated today. Cholesterol 121, triglycerides 89, HDL 35, LDL 68. CMP: sodium 141, potassium 4.1, GFR 90, creatinine 0.99, alkaline phosphatase 71, AST 18, ALT 18. CBC normal on 1/31/24.   Mild right inguinal lymphadenopathy, likely due to recent COVID infection. Reassuring symptoms continue to resolve, if persistent or worsening in 4-6 weeks,  recommend ultrasound.   Reassured of normal skin examination. No abnormal lesions. Ongoing close follow up and skin examinations at home. Recommend ABCDE criteria - asymmetry, border changes, coloration changes, size/diameter changes, evolution of lesion (worsening), etc.   Family history of cardiac disease - discussed modifiable risk factors - diet (increased lean proteins, fruits and vegetables), limit alcohol, avoid tobacco, goal exercise 150 minutes of moderate exercise per week. Ongoing close monitoring of blood pressures and cholesterol.   Establish care visit completed today.     Patient has been advised of split billing requirements and indicates understanding: Yes    Counseling  Appropriate preventive services were discussed with this patient, including applicable screening as appropriate for fall prevention, nutrition, physical activity, Tobacco-use cessation, weight loss and cognition.  Checklist reviewing preventive services available has been given to the patient.  Reviewed patient's diet, addressing concerns and/or questions.   He is at risk for lack of exercise and has been provided with information to increase physical activity for the benefit of his well-being.   He is at risk for psychosocial distress and has been provided with information to reduce risk.   The patient reports drinking more than one alcoholic drink per day and sometimes engages in binge or excessive drinking. The patient was counseled and given information about possible harmful effects of excessive alcohol intake as well as where to get help for alcohol problems.     See Patient Instructions    Return in about 1 year (around 2/27/2025) for Preventive Visit.    Aleida Jose is a 34 year old, presenting for the following:  Physical        2/27/2024     8:16 AM   Additional Questions   Roomed by gertrudis aguilera lpn     Health Care Directive  Patient does not have a Health Care Directive or Living Will: Discussed advance care planning  with patient; information given to patient to review.    TITI Jose presents to the clinic today for annual physical and to discuss the following:  Recent urology consult on 1/31/2024 with Dr. France.  Encounter regards to vasectomy, take up to schedule for next follow-up visit with Dr. France on 4/18/2024.  He and his wife currently have 2 children, 2 sons (5 weeks and 3).   Family history of cardiac disease, his father who was in his 60s and overall healthy recently underwent a four-vessel CABG, he was told that approximately 87% of his heart disease is hereditary.  His paternal grandfather also had 4 heart attacks.  Carlos declines chest pain, shortness of breath, centralized or peripheral edema.  He stays active with a minimum of 150 minutes of moderate exercise per week.  He tries eat a healthy diet.  He drinks 1-2 alcoholic beverages, 3-4 nights per week.  Working on cutting back further.  Mental health, good relationship with family and friends.  Stable job.  Concerns of palpable lump to right groin/thigh.  Started about a week and a half ago after sustaining a bruise, unclear cause to bruise.  Bruise has resolved, area is no longer tender.  Does have a freely mobile mass in this area.  Skin concerns, darker skin coloration/gray hue in different spots on left chest/shoulder  Vaccines - 2/3 Hep B completed, last tetanus 2013. Kindly defers COVID and Influenza immunizations today.   COVID + 4 weeks ago. Otherwise, staying healthy.         2/24/2024   General Health   How would you rate your overall physical health? Good   Feel stress (tense, anxious, or unable to sleep) Only a little   (!) STRESS CONCERN      2/24/2024   Nutrition   Three or more servings of calcium each day? Yes   Diet: Regular (no restrictions)   How many servings of fruit and vegetables per day? (!) 2-3   How many sweetened beverages each day? 0-1         2/24/2024   Exercise   Days per week of moderate/strenous exercise 3 days   Average  minutes spent exercising at this level 20 min         2/24/2024   Social Factors   Frequency of gathering with friends or relatives Once a week   Worry food won't last until get money to buy more No   Food not last or not have enough money for food? No   Do you have housing?  Yes   Are you worried about losing your housing? No   Lack of transportation? No   Unable to get utilities (heat,electricity)? No         2/24/2024   Dental   Dentist two times every year? Yes         2/24/2024   TB Screening   Were you born outside of [place]?  No       Today's PHQ-9 Score:       2/27/2024     8:00 AM   PHQ-9 SCORE   PHQ-9 Total Score MyChart 0   PHQ-9 Total Score 0         2/24/2024   Substance Use   Alcohol more than 3/day or more than 7/wk Yes   How often do you have a drink containing alcohol 2 to 3 times a week   How many alcohol drinks on typical day 3 or 4   How often do you have 5+ drinks at one occasion Weekly   Audit 2/3 Score 4   How often not able to stop drinking once started Never   How often failed to do what normally expected Never   How often needed first drink in am after a heavy drinking session Never   How often feeling of guilt or remorse after drinking Never   How often unable to remember what happened the night before Less than monthly   Have you or someone else been injured because of your drinking No   Has anyone been concerned or suggested you cut down on drinking No   TOTAL SCORE - AUDIT 8   Do you use any other substances recreationally? No     Social History     Tobacco Use    Smoking status: Never    Smokeless tobacco: Never   Vaping Use    Vaping Use: Never used   Substance Use Topics    Alcohol use: Yes     Comment: Alcoholic Drinks/day: social    Drug use: Not Currently     Types: Other     Comment: Drug use: No             2/24/2024   One time HIV Screening   Previous HIV test? No         2/24/2024   STI Screening   New sexual partner(s) since last STI/HIV test? No         2/24/2024  "  Contraception/Family Planning   Questions about contraception or family planning No        Reviewed and updated as needed this visit by Provider   Tobacco  Allergies  Meds  Problems  Med Hx  Surg Hx  Fam Hx            Past Medical History:   Diagnosis Date    Immune thrombocytopenic purpura (H) 2/19/2018    Overview:  platelets 12,000, petechiae    Personal history of diseases of the blood and blood-forming organs and certain disorders involving the immune mechanism (CODE)     No Comments Provided    Personal history of other mental and behavioral disorders     No Comments Provided     Past Surgical History:   Procedure Laterality Date    CYSTOSCOPY      2016,WNL     Review of Systems  Constitutional, HEENT, cardiovascular, pulmonary, GI, , musculoskeletal, neuro, skin, endocrine and psych systems are negative, except as otherwise noted.       Objective    Exam  /74 (BP Location: Right arm, Patient Position: Sitting, Cuff Size: Adult Large)   Pulse 100   Temp 97.3  F (36.3  C) (Tympanic)   Resp 20   Ht 1.88 m (6' 2\")   Wt 83.5 kg (184 lb)   SpO2 98%   BMI 23.62 kg/m     Estimated body mass index is 23.62 kg/m  as calculated from the following:    Height as of this encounter: 1.88 m (6' 2\").    Weight as of this encounter: 83.5 kg (184 lb).    Physical Exam  GENERAL: alert and no distress  EYES: Eyes grossly normal to inspection, PERRL and conjunctivae and sclerae normal  NECK: no adenopathy, no asymmetry, masses, or scars  RESP: lungs clear to auscultation - no rales, rhonchi or wheezes  CV: regular rate and rhythm, normal S1 S2, no S3 or S4, no murmur, click or rub, no peripheral edema  ABDOMEN: soft, nontender, no hepatosplenomegaly, no masses and bowel sounds normal  MS: no gross musculoskeletal defects noted, no edema  SKIN: no suspicious lesions or rashes  PSYCH: mentation appears normal, affect normal/bright  LYMPH: normal ant/post cervical, supraclavicular nodes. Mild right inguinal " lymphadenopathy, non tender, freely mobile.     Signed Electronically by: Nedra Crabtree PA-C    Answers submitted by the patient for this visit:  Patient Health Questionnaire (Submitted on 2/27/2024)  If you checked off any problems, how difficult have these problems made it for you to do your work, take care of things at home, or get along with other people?: Not difficult at all  PHQ9 TOTAL SCORE: 0  SHAY-7 (Submitted on 2/27/2024)  SHAY 7 TOTAL SCORE: 1

## 2024-02-27 NOTE — NURSING NOTE
"Patient presents to the clinic for physical.    FOOD SECURITY SCREENING QUESTIONS:    The next two questions are to help us understand your food security.  If you are feeling you need any assistance in this area, we have resources available to support you today.    Hunger Vital Signs:  Within the past 12 months we worried whether our food would run out before we got money to buy more. Never  Within the past 12 months the food we bought just didn't last and we didn't have money to get more. Never    Advance Care Directive on file? no  Advance Care Directive provided to patient? Declined.      Chief Complaint   Patient presents with    Physical       Initial /74 (BP Location: Right arm, Patient Position: Sitting, Cuff Size: Adult Large)   Pulse 100   Temp 97.3  F (36.3  C) (Tympanic)   Resp 20   Ht 1.88 m (6' 2\")   Wt 83.5 kg (184 lb)   SpO2 98%   BMI 23.62 kg/m   Estimated body mass index is 23.62 kg/m  as calculated from the following:    Height as of this encounter: 1.88 m (6' 2\").    Weight as of this encounter: 83.5 kg (184 lb).  Medication Reconciliation: complete        Ivette Yap LPN     "

## 2024-04-18 ENCOUNTER — OFFICE VISIT (OUTPATIENT)
Dept: UROLOGY | Facility: OTHER | Age: 34
End: 2024-04-18
Attending: UROLOGY
Payer: COMMERCIAL

## 2024-04-18 VITALS
RESPIRATION RATE: 20 BRPM | OXYGEN SATURATION: 98 % | WEIGHT: 185 LBS | DIASTOLIC BLOOD PRESSURE: 80 MMHG | HEIGHT: 74 IN | BODY MASS INDEX: 23.74 KG/M2 | HEART RATE: 86 BPM | SYSTOLIC BLOOD PRESSURE: 120 MMHG

## 2024-04-18 DIAGNOSIS — Z30.2 ENCOUNTER FOR VASECTOMY: Primary | ICD-10-CM

## 2024-04-18 DIAGNOSIS — Z98.52 STATUS POST VASECTOMY: ICD-10-CM

## 2024-04-18 PROCEDURE — 250N000009 HC RX 250: Performed by: UROLOGY

## 2024-04-18 PROCEDURE — 55250 REMOVAL OF SPERM DUCT(S): CPT | Performed by: UROLOGY

## 2024-04-18 RX ORDER — LIDOCAINE HYDROCHLORIDE 10 MG/ML
20 INJECTION, SOLUTION INFILTRATION; PERINEURAL ONCE
Status: COMPLETED | OUTPATIENT
Start: 2024-04-18 | End: 2024-04-18

## 2024-04-18 RX ADMIN — LIDOCAINE HYDROCHLORIDE 20 ML: 10 INJECTION, SOLUTION INFILTRATION; PERINEURAL at 11:45

## 2024-04-18 NOTE — PATIENT INSTRUCTIONS
Spoke about after care including avoiding blood thinners such as aspirin, NSAIDS 10 days prior and 2 weeks after.  Patient advised to take it easy for a least a week with limited activity and relaxation.  Ice and elevation recommended and explained.  Good scrotal support recommended.  Patient advised to continue birth control until told he is sterile.  No sex for 1 week after procedure.  No soaking for 2 weeks and may shower lightly in 2 days.  Neosporin TID to incisions until healed.  Advised to call with fever chills or concerns.      Patient voiced an understanding.

## 2024-04-18 NOTE — PROGRESS NOTES
34-year-old male seen virtually 1/31/2024 for vasectomy counseling.  No previous scrotal or inguinal surgery.  Denies any blood thinner use.  He does have a history of immune thrombocytopenic purpura with platelets around 12,000 at that time.  Things did normalize.  We elected to check a CBC.  Platelets were 192,000 with a normal H&H.    He is acceptable for cyst vasectomy.  Here today for the procedure    Vasectomy Procedure    Indication:  Desires Infertility    Risks discussed:  Bleeding, hematoma formation, need for surgery if bleeding occurs, infection, failure 2%, chronic pain 2%, loss of either testis, diminished libido, erectile dysfunction and anecdotal risk of prostate cancer.    Procedure:  Informed consent was obtained.  The patient was prepped and draped in a sterile fashion.  Each vas was palpably found and the skin over each vas and the area surrounding each vas was anesthetized with 1% lidocaine and allowed to dwell.  A small incision, 4mm in length was made over each vas and the vas was grabbed with the vas instrument.  The tissue surrounding each vas was sharply and bluntly dissected away.  1 cm of vas was removed and the proximal and distal ends were clamped with small hemostats.  The ends were cauterized and tied with 3-0 chromic.  The proximal end was looped on itself.  Hemostasis was obtained.  The small incisions were closed with 3-0 chromic in an interrupted fashion.  Gauze was applied.    Complications:  none    Patient tolerated procedure well.    Post op instructions given:  Ice on/off for next 4 days.  Good scrotal support.  No strenuous exercise or lifting for at least one week.  No sex for 1 week.  May shower in 2 days.  No soaking for 2 weeks or until incisions healed.  Apply Neosporin TID to incisions.  Continue birth control until told you are sterile.  Call with fever, chills, bleeding, pain or concerns.  Do not drive on pain medications.     Assessment:  Encounter for  vasectomy      Plan:  Discussion with patient about the procedure and the technique used.  Explained to the patient that vasectomy is intended to be a permanent form of contraception and may not be reversible. Risks explained including bleeding with hematoma formation of 1-2%, infection 1-2%, injury to testis with loss of testis, failure rate of 2% requiring repeat vasectomy, 1-2% risk of chronic pain which may require additional surgery to correct, hypogonadism, erectile dysfunction (<2%), and a slight decrease in the volume of ejaculate.  Risk of pregnancy after vasectomy of 1 in 2000 for men who have post-vasectomy azoospermia or rare non-motile sperm explained to patient.  Patient given the option of banking sperm.      Spoke about after care including avoiding blood thinners such as aspirin, NSAIDS 10 days prior and 2 weeks after.  Patient advised to take it easy for a least a week with limited activity and relaxation.  Ice and elevation recommended and explained.  Good scrotal support recommended.  Patient advised to continue birth control until told he is sterile.  No sex for 1 week after procedure.  No soaking for 2 weeks and may shower lightly in 2 days.  Neosporin TID to incisions until healed.  Advised to call with fever chills or concerns.      Patient voiced an understanding.

## 2024-04-18 NOTE — NURSING NOTE
Vasectomy  Per verbal order read back by Esau France MD to prep patient for vasectomy.  Patient positioned in supine position, perineum area prepped with chlorhexidene Gluconate and patient draped per sterile technique.    Lidocaine 2%- See MAR    Romeo Protocol    A. Pre-procedure verification complete Yes  1-relevant information / documentation available, reviewed and properly matched to the patient; 2-consent accurate and complete, 3-equipment and supplies available    B. Site marking complete Yes  Site marked if not in continuous attendance with patient    C. TIME OUT completed Yes  Time Out was conducted just prior to starting procedure to verify the eight required elements: 1-patient identity, 2-consent accurate and complete, 3-position, 4-correct side/site marked (if applicable), 5-procedure, 6-relevant images / results properly labeled and displayed (if applicable), 7-antibiotics / irrigation fluids (if applicable), 8-safety precautions.    After procedure perineum area rinsed. Semen analysis container given to patient. Patient reminded to have a semen analysis performed 3 months after the procedure to confirm sterility and to ejaculate about 1-2 dozen times following the vasectomy and prior to semen collection. Discharge instructions reviewed with patient. Patient verbalized understanding of discharge instructions and discharged ambulatory.

## 2024-04-24 NOTE — PROGRESS NOTES
Patient called today.  He has had some swelling in his skin and in both sides of his scrotum.  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    No fevers no chills.  Pain is about a 2 out of 10 with some occasional sharp discomfort when he stands up.    No drainage from the incision.  No bruising that is obvious whatsoever.  He is really not been wearing any scrotal support just loose underwear.    He has a history of C. difficile twice in the past so I am reluctant to place on antibiotics at this time.    I asked him to keep the area elevated with ice on and off.  He should probably stay home from work the next couple days.  If he gets a fever or chill or the swelling gets worse then he will need to be seen in the ER for evaluation.    Patient voiced an understanding.  He will keep me updated on his progress.  Dr. France

## 2024-05-13 NOTE — PROGRESS NOTES
Patient called to state that his left side is now hurting.  No swelling and can still feel the walnut where the surgery was done.    No fever chills.  No redness.      Asked patient to continue the NSAIDS with plenty of water and pepcid to protect his gut.    Alternate with warm/cold depending on what feels better.    Asked patient to hang in there.    Dr. France

## 2024-07-19 ENCOUNTER — LAB (OUTPATIENT)
Dept: LAB | Facility: OTHER | Age: 34
End: 2024-07-19
Payer: COMMERCIAL

## 2024-07-19 DIAGNOSIS — Z98.52 STATUS POST VASECTOMY: ICD-10-CM

## 2024-07-19 LAB
SPECIMEN VOL SMN: 2.8 ML
SPERM MOTILE SMN QL MICRO: ABNORMAL
SPERM P VAS #/AREA SMN HPF: ABNORMAL /[HPF]

## 2024-07-19 PROCEDURE — 89321 SEMEN ANAL SPERM DETECTION: CPT | Mod: ZL

## 2024-07-19 NOTE — RESULT ENCOUNTER NOTE
Jemma, can we repeat another semen analysis.  Javier Jsoe, by definition, < 100,000 non motile sperm after vasectomy is considered sterile but I would like you to repeat another in 2 weeks.  Try to ejaculate often so we clear even more.  Dr. France

## 2024-07-22 ENCOUNTER — MYC MEDICAL ADVICE (OUTPATIENT)
Dept: UROLOGY | Facility: OTHER | Age: 34
End: 2024-07-22
Payer: COMMERCIAL

## 2024-07-22 DIAGNOSIS — Z98.52 STATUS POST VASECTOMY: Primary | ICD-10-CM

## 2024-09-07 ENCOUNTER — MYC REFILL (OUTPATIENT)
Dept: FAMILY MEDICINE | Facility: OTHER | Age: 34
End: 2024-09-07
Payer: COMMERCIAL

## 2024-09-07 DIAGNOSIS — N52.8 OTHER MALE ERECTILE DYSFUNCTION: ICD-10-CM

## 2024-09-11 RX ORDER — SILDENAFIL CITRATE 20 MG/1
20-40 TABLET ORAL DAILY PRN
Qty: 40 TABLET | Refills: 1 | Status: SHIPPED | OUTPATIENT
Start: 2024-09-11

## 2024-09-11 NOTE — TELEPHONE ENCOUNTER
Requested Prescriptions   Pending Prescriptions Disp Refills    sildenafil (REVATIO) 20 MG tablet 40 tablet 5     Sig: Take 1-2 tablets (20-40 mg) by mouth daily as needed (ED).   Last Prescription Date:   3/10/20  Last Fill Qty/Refills:         40, R-5    Last Office Visit:              2/27/24 (Physical)   Future Office visit:           None    Per LOV note:  Erectile dysfunction, stable, recently met with Dr. France in regards to vasectomy, has upcoming visit in April 2024. Return in about 1 year (around 2/27/2025) for Preventive Visit.     Prescription refilled per RN Medication Refill Policy.................... Nela Funk RN ....................  9/11/2024   2:43 PM

## 2024-11-14 ENCOUNTER — LAB (OUTPATIENT)
Dept: LAB | Facility: OTHER | Age: 34
End: 2024-11-14
Payer: COMMERCIAL

## 2024-11-14 DIAGNOSIS — Z98.52 STATUS POST VASECTOMY: ICD-10-CM

## 2024-11-14 LAB
SPECIMEN VOL SMN: 2.2 ML
SPERM MOTILE SMN QL MICRO: ABNORMAL
SPERM P VAS #/AREA SMN HPF: ABNORMAL /[HPF]

## 2024-11-14 PROCEDURE — 89321 SEMEN ANAL SPERM DETECTION: CPT | Mod: ZL

## 2024-11-14 NOTE — RESULT ENCOUNTER NOTE
Discussed with patient.  Per guidelines this is considered sterile.  May discontinue continue birth control.  Dr. France

## 2024-11-14 NOTE — RESULT ENCOUNTER NOTE
Can you guys get a hold of the lab.  This should be done on non centrifuged specimen just as the initial 1 was done.  Can we ask why they sent concentrated or change their methodology?    If not I would still recommend this gentleman repeat it but not centrifuged unless the lab can justify.  Thanks Javier

## 2024-11-21 ENCOUNTER — OFFICE VISIT (OUTPATIENT)
Dept: FAMILY MEDICINE | Facility: OTHER | Age: 34
End: 2024-11-21
Attending: PHYSICIAN ASSISTANT
Payer: COMMERCIAL

## 2024-11-21 VITALS
BODY MASS INDEX: 25.04 KG/M2 | DIASTOLIC BLOOD PRESSURE: 66 MMHG | TEMPERATURE: 96.6 F | HEART RATE: 76 BPM | SYSTOLIC BLOOD PRESSURE: 114 MMHG | WEIGHT: 195 LBS

## 2024-11-21 DIAGNOSIS — M25.561 CHRONIC PAIN OF BOTH KNEES: ICD-10-CM

## 2024-11-21 DIAGNOSIS — M77.11 RIGHT LATERAL EPICONDYLITIS: ICD-10-CM

## 2024-11-21 DIAGNOSIS — M25.562 CHRONIC PAIN OF BOTH KNEES: ICD-10-CM

## 2024-11-21 DIAGNOSIS — M25.50 POLYARTHRALGIA: Primary | ICD-10-CM

## 2024-11-21 DIAGNOSIS — G89.29 CHRONIC PAIN OF BOTH KNEES: ICD-10-CM

## 2024-11-21 DIAGNOSIS — M79.641 PAIN OF RIGHT HAND: ICD-10-CM

## 2024-11-21 LAB
ALBUMIN SERPL BCG-MCNC: 4.7 G/DL (ref 3.5–5.2)
ALP SERPL-CCNC: 105 U/L (ref 40–150)
ALT SERPL W P-5'-P-CCNC: 32 U/L (ref 0–70)
ANION GAP SERPL CALCULATED.3IONS-SCNC: 10 MMOL/L (ref 7–15)
AST SERPL W P-5'-P-CCNC: 28 U/L (ref 0–45)
BASOPHILS # BLD AUTO: 0 10E3/UL (ref 0–0.2)
BASOPHILS NFR BLD AUTO: 1 %
BILIRUB SERPL-MCNC: 0.4 MG/DL
BUN SERPL-MCNC: 17.4 MG/DL (ref 6–20)
CALCIUM SERPL-MCNC: 9.5 MG/DL (ref 8.8–10.4)
CHLORIDE SERPL-SCNC: 100 MMOL/L (ref 98–107)
CREAT SERPL-MCNC: 0.97 MG/DL (ref 0.67–1.17)
EGFRCR SERPLBLD CKD-EPI 2021: >90 ML/MIN/1.73M2
EOSINOPHIL # BLD AUTO: 0.2 10E3/UL (ref 0–0.7)
EOSINOPHIL NFR BLD AUTO: 2 %
ERYTHROCYTE [DISTWIDTH] IN BLOOD BY AUTOMATED COUNT: 11.9 % (ref 10–15)
GLUCOSE SERPL-MCNC: 104 MG/DL (ref 70–99)
HCO3 SERPL-SCNC: 28 MMOL/L (ref 22–29)
HCT VFR BLD AUTO: 45.7 % (ref 40–53)
HGB BLD-MCNC: 15.4 G/DL (ref 13.3–17.7)
IMM GRANULOCYTES # BLD: 0 10E3/UL
IMM GRANULOCYTES NFR BLD: 0 %
LYMPHOCYTES # BLD AUTO: 1.4 10E3/UL (ref 0.8–5.3)
LYMPHOCYTES NFR BLD AUTO: 18 %
MCH RBC QN AUTO: 30.5 PG (ref 26.5–33)
MCHC RBC AUTO-ENTMCNC: 33.7 G/DL (ref 31.5–36.5)
MCV RBC AUTO: 91 FL (ref 78–100)
MONOCYTES # BLD AUTO: 0.5 10E3/UL (ref 0–1.3)
MONOCYTES NFR BLD AUTO: 6 %
NEUTROPHILS # BLD AUTO: 5.8 10E3/UL (ref 1.6–8.3)
NEUTROPHILS NFR BLD AUTO: 73 %
NRBC # BLD AUTO: 0 10E3/UL
NRBC BLD AUTO-RTO: 0 /100
PLATELET # BLD AUTO: 216 10E3/UL (ref 150–450)
POTASSIUM SERPL-SCNC: 4.1 MMOL/L (ref 3.4–5.3)
PROT SERPL-MCNC: 7.9 G/DL (ref 6.4–8.3)
RBC # BLD AUTO: 5.05 10E6/UL (ref 4.4–5.9)
SODIUM SERPL-SCNC: 138 MMOL/L (ref 135–145)
TSH SERPL DL<=0.005 MIU/L-ACNC: 1.54 UIU/ML (ref 0.3–4.2)
WBC # BLD AUTO: 8 10E3/UL (ref 4–11)

## 2024-11-21 PROCEDURE — 82310 ASSAY OF CALCIUM: CPT | Mod: ZL | Performed by: PHYSICIAN ASSISTANT

## 2024-11-21 PROCEDURE — 85004 AUTOMATED DIFF WBC COUNT: CPT | Mod: ZL | Performed by: PHYSICIAN ASSISTANT

## 2024-11-21 PROCEDURE — 82040 ASSAY OF SERUM ALBUMIN: CPT | Mod: ZL | Performed by: PHYSICIAN ASSISTANT

## 2024-11-21 PROCEDURE — 85014 HEMATOCRIT: CPT | Mod: ZL | Performed by: PHYSICIAN ASSISTANT

## 2024-11-21 PROCEDURE — 84443 ASSAY THYROID STIM HORMONE: CPT | Mod: ZL | Performed by: PHYSICIAN ASSISTANT

## 2024-11-21 PROCEDURE — 36415 COLL VENOUS BLD VENIPUNCTURE: CPT | Mod: ZL | Performed by: PHYSICIAN ASSISTANT

## 2024-11-21 PROCEDURE — 84155 ASSAY OF PROTEIN SERUM: CPT | Mod: ZL | Performed by: PHYSICIAN ASSISTANT
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
## 2024-11-23 LAB
A PHAGOCYTOPH DNA BLD QL NAA+PROBE: NOT DETECTED
BABESIA DNA BLD QL NAA+PROBE: NOT DETECTED
EHRLICHIA DNA SPEC QL NAA+PROBE: NOT DETECTED

## 2024-11-25 LAB
ANA SER QL IF: NEGATIVE
B BURGDOR IGG+IGM SER QL: 0.58

## 2025-01-27 ENCOUNTER — OFFICE VISIT (OUTPATIENT)
Dept: FAMILY MEDICINE | Facility: OTHER | Age: 35
End: 2025-01-27
Payer: COMMERCIAL

## 2025-01-27 VITALS
RESPIRATION RATE: 16 BRPM | HEIGHT: 74 IN | OXYGEN SATURATION: 100 % | BODY MASS INDEX: 23.92 KG/M2 | SYSTOLIC BLOOD PRESSURE: 130 MMHG | HEART RATE: 78 BPM | WEIGHT: 186.4 LBS | DIASTOLIC BLOOD PRESSURE: 84 MMHG | TEMPERATURE: 98.1 F

## 2025-01-27 DIAGNOSIS — R05.1 ACUTE COUGH: ICD-10-CM

## 2025-01-27 DIAGNOSIS — R19.7 DIARRHEA, UNSPECIFIED TYPE: ICD-10-CM

## 2025-01-27 DIAGNOSIS — J01.90 ACUTE SINUSITIS WITH SYMPTOMS > 10 DAYS: Primary | ICD-10-CM

## 2025-01-27 DIAGNOSIS — R11.0 NAUSEA: ICD-10-CM

## 2025-01-27 DIAGNOSIS — L82.1 SEBORRHEIC KERATOSES: ICD-10-CM

## 2025-01-27 RX ORDER — BENZONATATE 200 MG/1
200 CAPSULE ORAL 3 TIMES DAILY PRN
Qty: 30 CAPSULE | Refills: 0 | Status: SHIPPED | OUTPATIENT
Start: 2025-01-27

## 2025-01-27 RX ORDER — ONDANSETRON 4 MG/1
4 TABLET, ORALLY DISINTEGRATING ORAL EVERY 8 HOURS PRN
Qty: 15 TABLET | Refills: 1 | Status: SHIPPED | OUTPATIENT
Start: 2025-01-27

## 2025-01-27 NOTE — NURSING NOTE
"Chief Complaint   Patient presents with    Consult     Prolonged illness       Initial /84   Pulse 78   Temp 98.1  F (36.7  C) (Tympanic)   Resp 16   Ht 1.88 m (6' 2\")   Wt 84.6 kg (186 lb 6.4 oz)   SpO2 100%   BMI 23.93 kg/m   Estimated body mass index is 23.93 kg/m  as calculated from the following:    Height as of this encounter: 1.88 m (6' 2\").    Weight as of this encounter: 84.6 kg (186 lb 6.4 oz).  Medication Review: complete    The next two questions are to help us understand your food security.  If you are feeling you need any assistance in this area, we have resources available to support you today.          2/24/2024   SDOH- Food Insecurity   Within the past 12 months, did you worry that your food would run out before you got money to buy more? N   Within the past 12 months, did the food you bought just not last and you didn t have money to get more? N         Health Care Directive:  Patient does not have a Health Care Directive: Discussed advance care planning with patient; however, patient declined at this time.    Kathy Sow, GI      "

## 2025-01-27 NOTE — PATIENT INSTRUCTIONS
I recommend Ativia yogurt for the probiotics.  I sent tessalon capsules for the cough.  I will also send an antibiotic for the sinus infection.    I sent Zofran for your trip. This is for nausea.

## 2025-01-27 NOTE — PROGRESS NOTES
"  Subjective   Damon is a 35 year old, presenting for the following health issues:  Consult (Prolonged illness)        1/27/2025     1:43 PM   Additional Questions   Roomed by Kathy CLAROS LPN       ICD-10-CM    1. Acute sinusitis with symptoms > 10 days  J01.90 amoxicillin-clavulanate (AUGMENTIN) 875-125 MG tablet      2. Acute cough  R05.1 benzonatate (TESSALON) 200 MG capsule      3. Nausea  R11.0 ondansetron (ZOFRAN ODT) 4 MG ODT tab      4. Seborrheic keratoses  L82.1       Damon is a very pleasant 35-year-old male patient who presents to the clinic today with continued illness.  See HPI below.  Symptoms are currently suggestive of acute left frontal sinusitis.  Will treat with 5-day course of Augmentin.  He does have a history of developing C. difficile from antibiotics.  Recommended pre and probiotics.  He continues with acute cough that has slightly improved.  Denies fevers, chills, or sweats recently.  Low suspicion for pneumonia. Prescription sent for Tessalon capsules as needed.  Recommended adequate hydration and throat lozenges.    He has questions about skin lesions on his upper chest.  These appear to be seborrheic keratoses.  Patient was educated that these are benign lesions.    He is going on vacation to [place] and may be doing some deep sea fishing.  Prescription sent for Zofran as needed for nausea.    History of Present Illness       Headaches:   Since the patient's last clinic visit, headaches are: no change  The patient is getting headaches:  Everyday  He is able to do normal daily activities when he has a migraine.  The patient is taking the following rescue/relief medications:  Ibuprofen (Advil, Motrin) and Tylenol   Patient states \"I get some relief\" from the rescue/relief medications.   The patient is taking the following medications to prevent migraines:  No medications to prevent migraines  In the past 4 weeks, the patient has gone to an Urgent Care or Emergency Room 0 times times due to " "headaches.   He is taking medications regularly.     Started not feeling well beginning of January.  Fever, body aches, cough.  Other family members were all ill at the same time.  Then developed pressure above left eye which has continued daily  Using Ibuprofen  Leaving for [place] on Saturday.  Continues with cough    Review of Systems  Constitutional, HEENT, cardiovascular, pulmonary, gi and gu systems are negative, except as otherwise noted.      Objective    /84   Pulse 78   Temp 98.1  F (36.7  C) (Tympanic)   Resp 16   Ht 1.88 m (6' 2\")   Wt 84.6 kg (186 lb 6.4 oz)   SpO2 100%   BMI 23.93 kg/m    Body mass index is 23.93 kg/m .  Physical Exam  Constitutional:       General: He is not in acute distress.     Appearance: Normal appearance. He is not ill-appearing.   HENT:      Head: Normocephalic.      Right Ear: Tympanic membrane, ear canal and external ear normal.      Left Ear: Tympanic membrane, ear canal and external ear normal.      Nose: Congestion present.      Mouth/Throat:      Mouth: Mucous membranes are moist.      Pharynx: No oropharyngeal exudate or posterior oropharyngeal erythema.   Eyes:      Conjunctiva/sclera: Conjunctivae normal.      Comments: Pain of left frontal sinus when palpated   Cardiovascular:      Rate and Rhythm: Normal rate and regular rhythm.      Pulses: Normal pulses.      Heart sounds: Normal heart sounds.   Pulmonary:      Effort: Pulmonary effort is normal.      Breath sounds: Normal breath sounds. No wheezing or rhonchi.      Comments: Nonproductive cough  Skin:     General: Skin is warm and dry.      Comments: Seborrheic keratoses to upper chest   Neurological:      Mental Status: He is alert and oriented to person, place, and time.   Psychiatric:         Behavior: Behavior normal.          Signed Electronically by: SHIRA Amaya CNP    "

## 2025-01-30 ENCOUNTER — LAB (OUTPATIENT)
Dept: LAB | Facility: OTHER | Age: 35
End: 2025-01-30
Payer: COMMERCIAL

## 2025-01-30 DIAGNOSIS — R19.7 DIARRHEA, UNSPECIFIED TYPE: ICD-10-CM

## 2025-01-30 LAB — C DIFF TOX B STL QL: NEGATIVE

## 2025-01-30 PROCEDURE — 87507 IADNA-DNA/RNA PROBE TQ 12-25: CPT | Mod: ZL

## 2025-01-30 PROCEDURE — 87493 C DIFF AMPLIFIED PROBE: CPT | Mod: ZL

## 2025-01-31 LAB
ADV 40+41 DNA STL QL NAA+NON-PROBE: NEGATIVE
ASTRO TYP 1-8 RNA STL QL NAA+NON-PROBE: NEGATIVE
C CAYETANENSIS DNA STL QL NAA+NON-PROBE: NEGATIVE
CAMPYLOBACTER DNA SPEC NAA+PROBE: NEGATIVE
CRYPTOSP DNA STL QL NAA+NON-PROBE: NEGATIVE
E COLI O157 DNA STL QL NAA+NON-PROBE: NORMAL
E HISTOLYT DNA STL QL NAA+NON-PROBE: NEGATIVE
EAEC ASTA GENE ISLT QL NAA+PROBE: NEGATIVE
EC STX1+STX2 GENES STL QL NAA+NON-PROBE: NEGATIVE
EPEC EAE GENE STL QL NAA+NON-PROBE: NEGATIVE
ETEC LTA+ST1A+ST1B TOX ST NAA+NON-PROBE: NEGATIVE
G LAMBLIA DNA STL QL NAA+NON-PROBE: NEGATIVE
NOROVIRUS GI+II RNA STL QL NAA+NON-PROBE: NEGATIVE
P SHIGELLOIDES DNA STL QL NAA+NON-PROBE: NEGATIVE
RVA RNA STL QL NAA+NON-PROBE: NEGATIVE
SALMONELLA SP RPOD STL QL NAA+PROBE: NEGATIVE
SAPO I+II+IV+V RNA STL QL NAA+NON-PROBE: NEGATIVE
SHIGELLA SP+EIEC IPAH ST NAA+NON-PROBE: NEGATIVE
V CHOLERAE DNA SPEC QL NAA+PROBE: NEGATIVE
VIBRIO DNA SPEC NAA+PROBE: NEGATIVE
Y ENTEROCOL DNA STL QL NAA+PROBE: NEGATIVE

## 2025-04-05 ENCOUNTER — HEALTH MAINTENANCE LETTER (OUTPATIENT)
Age: 35
End: 2025-04-05

## 2025-06-27 ENCOUNTER — RESULTS FOLLOW-UP (OUTPATIENT)
Dept: FAMILY MEDICINE | Facility: OTHER | Age: 35
End: 2025-06-27

## 2025-06-28 ENCOUNTER — APPOINTMENT (OUTPATIENT)
Dept: LAB | Facility: OTHER | Age: 35
End: 2025-06-28
Attending: INTERNAL MEDICINE
Payer: COMMERCIAL

## 2025-08-23 ENCOUNTER — MYC REFILL (OUTPATIENT)
Dept: FAMILY MEDICINE | Facility: OTHER | Age: 35
End: 2025-08-23
Payer: COMMERCIAL

## 2025-08-23 DIAGNOSIS — N52.8 OTHER MALE ERECTILE DYSFUNCTION: ICD-10-CM

## 2025-08-27 RX ORDER — SILDENAFIL CITRATE 20 MG/1
20-40 TABLET ORAL DAILY PRN
Qty: 20 TABLET | Refills: 0 | Status: SHIPPED | OUTPATIENT
Start: 2025-08-27

## 2025-08-28 ENCOUNTER — TELEPHONE (OUTPATIENT)
Dept: FAMILY MEDICINE | Facility: OTHER | Age: 35
End: 2025-08-28
Payer: COMMERCIAL

## (undated) RX ORDER — LIDOCAINE HYDROCHLORIDE 10 MG/ML
INJECTION, SOLUTION INFILTRATION; PERINEURAL
Status: DISPENSED
Start: 2024-04-18

## (undated) RX ORDER — GINSENG 100 MG
CAPSULE ORAL
Status: DISPENSED
Start: 2024-04-18